# Patient Record
Sex: FEMALE | Race: WHITE | NOT HISPANIC OR LATINO | Employment: OTHER | URBAN - NONMETROPOLITAN AREA
[De-identification: names, ages, dates, MRNs, and addresses within clinical notes are randomized per-mention and may not be internally consistent; named-entity substitution may affect disease eponyms.]

---

## 2023-04-02 DIAGNOSIS — I73.9 PERIPHERAL VASCULAR DISEASE, UNSPECIFIED (CMS-HCC): ICD-10-CM

## 2023-04-06 RX ORDER — PENTOXIFYLLINE 400 MG/1
TABLET, EXTENDED RELEASE ORAL
Qty: 90 TABLET | Refills: 2 | Status: SHIPPED | OUTPATIENT
Start: 2023-04-06 | End: 2023-07-03 | Stop reason: SDUPTHER

## 2023-06-26 DIAGNOSIS — E78.5 HYPERLIPIDEMIA, UNSPECIFIED HYPERLIPIDEMIA TYPE: ICD-10-CM

## 2023-06-26 DIAGNOSIS — I73.9 PERIPHERAL VASCULAR DISEASE, UNSPECIFIED (CMS-HCC): ICD-10-CM

## 2023-06-26 DIAGNOSIS — F32.A DEPRESSION, UNSPECIFIED DEPRESSION TYPE: ICD-10-CM

## 2023-06-26 DIAGNOSIS — I10 BENIGN HYPERTENSION: ICD-10-CM

## 2023-06-26 DIAGNOSIS — K21.9 CHRONIC GERD: ICD-10-CM

## 2023-06-26 RX ORDER — OMEPRAZOLE 20 MG/1
20 CAPSULE, DELAYED RELEASE ORAL DAILY
COMMUNITY
End: 2023-06-26 | Stop reason: SDUPTHER

## 2023-06-26 RX ORDER — OMEPRAZOLE 20 MG/1
20 CAPSULE, DELAYED RELEASE ORAL DAILY
Qty: 90 CAPSULE | Refills: 1 | Status: SHIPPED | OUTPATIENT
Start: 2023-06-26 | End: 2023-07-10 | Stop reason: SDUPTHER

## 2023-07-03 LAB
ANION GAP IN SER/PLAS: 13 MMOL/L (ref 10–20)
CALCIUM (MG/DL) IN SER/PLAS: 9.6 MG/DL (ref 8.6–10.3)
CARBON DIOXIDE, TOTAL (MMOL/L) IN SER/PLAS: 28 MMOL/L (ref 21–32)
CHLORIDE (MMOL/L) IN SER/PLAS: 104 MMOL/L (ref 98–107)
CREATININE (MG/DL) IN SER/PLAS: 1.04 MG/DL (ref 0.5–1.05)
ESTIMATED AVERAGE GLUCOSE FOR HBA1C: 146 MG/DL
GFR FEMALE: 55 ML/MIN/1.73M2
GLUCOSE (MG/DL) IN SER/PLAS: 160 MG/DL (ref 74–99)
HEMOGLOBIN A1C/HEMOGLOBIN TOTAL IN BLOOD: 6.7 %
POTASSIUM (MMOL/L) IN SER/PLAS: 4.4 MMOL/L (ref 3.5–5.3)
SODIUM (MMOL/L) IN SER/PLAS: 141 MMOL/L (ref 136–145)
UREA NITROGEN (MG/DL) IN SER/PLAS: 14 MG/DL (ref 6–23)

## 2023-07-03 RX ORDER — NIFEDIPINE 30 MG/1
30 TABLET, FILM COATED, EXTENDED RELEASE ORAL DAILY
Qty: 30 TABLET | Refills: 0 | Status: SHIPPED | OUTPATIENT
Start: 2023-07-03 | End: 2023-07-10 | Stop reason: SDUPTHER

## 2023-07-03 RX ORDER — ROSUVASTATIN CALCIUM 40 MG/1
40 TABLET, COATED ORAL DAILY
Qty: 30 TABLET | Refills: 0 | Status: SHIPPED | OUTPATIENT
Start: 2023-07-03 | End: 2023-07-10 | Stop reason: SDUPTHER

## 2023-07-03 RX ORDER — LISINOPRIL 5 MG/1
5 TABLET ORAL DAILY
COMMUNITY
End: 2023-07-03 | Stop reason: SDUPTHER

## 2023-07-03 RX ORDER — LISINOPRIL 5 MG/1
5 TABLET ORAL DAILY
Qty: 30 TABLET | Refills: 0 | Status: SHIPPED | OUTPATIENT
Start: 2023-07-03 | End: 2023-07-10 | Stop reason: SDUPTHER

## 2023-07-03 RX ORDER — NIFEDIPINE 30 MG/1
30 TABLET, FILM COATED, EXTENDED RELEASE ORAL DAILY
COMMUNITY
End: 2023-07-03 | Stop reason: SDUPTHER

## 2023-07-03 RX ORDER — PAROXETINE HYDROCHLORIDE 20 MG/1
20 TABLET, FILM COATED ORAL DAILY
Qty: 30 TABLET | Refills: 0 | Status: SHIPPED | OUTPATIENT
Start: 2023-07-03 | End: 2023-07-10 | Stop reason: SDUPTHER

## 2023-07-03 RX ORDER — ROSUVASTATIN CALCIUM 40 MG/1
40 TABLET, COATED ORAL DAILY
COMMUNITY
End: 2023-07-03 | Stop reason: SDUPTHER

## 2023-07-03 RX ORDER — PAROXETINE HYDROCHLORIDE 20 MG/1
20 TABLET, FILM COATED ORAL DAILY
COMMUNITY
End: 2023-07-03 | Stop reason: SDUPTHER

## 2023-07-03 RX ORDER — PENTOXIFYLLINE 400 MG/1
400 TABLET, EXTENDED RELEASE ORAL 3 TIMES DAILY
Qty: 90 TABLET | Refills: 2 | Status: SHIPPED | OUTPATIENT
Start: 2023-07-03 | End: 2023-07-10 | Stop reason: SDUPTHER

## 2023-07-10 ENCOUNTER — OFFICE VISIT (OUTPATIENT)
Dept: PRIMARY CARE | Facility: CLINIC | Age: 78
End: 2023-07-10
Payer: MEDICARE

## 2023-07-10 VITALS
BODY MASS INDEX: 31.49 KG/M2 | HEART RATE: 84 BPM | HEIGHT: 65 IN | OXYGEN SATURATION: 95 % | WEIGHT: 189 LBS | SYSTOLIC BLOOD PRESSURE: 116 MMHG | DIASTOLIC BLOOD PRESSURE: 66 MMHG

## 2023-07-10 DIAGNOSIS — K21.9 CHRONIC GERD: ICD-10-CM

## 2023-07-10 DIAGNOSIS — I70.212 ATHEROSCLEROSIS OF NATIVE ARTERIES OF EXTREMITIES WITH INTERMITTENT CLAUDICATION, LEFT LEG (CMS-HCC): ICD-10-CM

## 2023-07-10 DIAGNOSIS — E78.5 HYPERLIPIDEMIA, UNSPECIFIED HYPERLIPIDEMIA TYPE: ICD-10-CM

## 2023-07-10 DIAGNOSIS — K62.5 RECTAL BLEEDING: ICD-10-CM

## 2023-07-10 DIAGNOSIS — D69.2 OTHER NONTHROMBOCYTOPENIC PURPURA (CMS-HCC): Primary | ICD-10-CM

## 2023-07-10 DIAGNOSIS — I73.9 PERIPHERAL VASCULAR DISEASE, UNSPECIFIED (CMS-HCC): ICD-10-CM

## 2023-07-10 DIAGNOSIS — F32.A DEPRESSION, UNSPECIFIED DEPRESSION TYPE: ICD-10-CM

## 2023-07-10 DIAGNOSIS — I10 BENIGN HYPERTENSION: ICD-10-CM

## 2023-07-10 DIAGNOSIS — F32.0 CURRENT MILD EPISODE OF MAJOR DEPRESSIVE DISORDER WITHOUT PRIOR EPISODE (CMS-HCC): ICD-10-CM

## 2023-07-10 DIAGNOSIS — I73.9 PAD (PERIPHERAL ARTERY DISEASE) (CMS-HCC): ICD-10-CM

## 2023-07-10 DIAGNOSIS — E11.9 TYPE 2 DIABETES MELLITUS WITHOUT COMPLICATION, WITHOUT LONG-TERM CURRENT USE OF INSULIN (MULTI): ICD-10-CM

## 2023-07-10 DIAGNOSIS — I15.9 SECONDARY HYPERTENSION: ICD-10-CM

## 2023-07-10 PROBLEM — E66.811 OBESITY (BMI 30.0-34.9): Status: ACTIVE | Noted: 2023-07-10

## 2023-07-10 PROBLEM — Z85.41 HISTORY OF CERVICAL CANCER: Status: ACTIVE | Noted: 2023-07-10

## 2023-07-10 PROBLEM — Q89.01 SPLEEN ABSENT: Status: ACTIVE | Noted: 2023-07-10

## 2023-07-10 PROBLEM — E66.9 OBESITY (BMI 30.0-34.9): Status: ACTIVE | Noted: 2023-07-10

## 2023-07-10 PROCEDURE — 99214 OFFICE O/P EST MOD 30 MIN: CPT | Performed by: INTERNAL MEDICINE

## 2023-07-10 PROCEDURE — 1159F MED LIST DOCD IN RCRD: CPT | Performed by: INTERNAL MEDICINE

## 2023-07-10 PROCEDURE — 3074F SYST BP LT 130 MM HG: CPT | Performed by: INTERNAL MEDICINE

## 2023-07-10 PROCEDURE — 1160F RVW MEDS BY RX/DR IN RCRD: CPT | Performed by: INTERNAL MEDICINE

## 2023-07-10 PROCEDURE — 1157F ADVNC CARE PLAN IN RCRD: CPT | Performed by: INTERNAL MEDICINE

## 2023-07-10 PROCEDURE — 3078F DIAST BP <80 MM HG: CPT | Performed by: INTERNAL MEDICINE

## 2023-07-10 RX ORDER — ROSUVASTATIN CALCIUM 40 MG/1
40 TABLET, COATED ORAL DAILY
Qty: 90 TABLET | Refills: 1 | Status: SHIPPED | OUTPATIENT
Start: 2023-07-10 | End: 2024-01-09 | Stop reason: SDUPTHER

## 2023-07-10 RX ORDER — ASPIRIN 81 MG/1
81 TABLET ORAL
COMMUNITY

## 2023-07-10 RX ORDER — CLOPIDOGREL BISULFATE 75 MG/1
75 TABLET ORAL
COMMUNITY
Start: 2021-10-17 | End: 2023-07-10 | Stop reason: SDUPTHER

## 2023-07-10 RX ORDER — OMEPRAZOLE 20 MG/1
20 CAPSULE, DELAYED RELEASE ORAL DAILY
Qty: 90 CAPSULE | Refills: 1 | Status: SHIPPED | OUTPATIENT
Start: 2023-07-10 | End: 2024-01-09 | Stop reason: WASHOUT

## 2023-07-10 RX ORDER — CLOPIDOGREL BISULFATE 75 MG/1
75 TABLET ORAL
Qty: 90 TABLET | Refills: 1 | Status: SHIPPED | OUTPATIENT
Start: 2023-07-10 | End: 2024-01-09 | Stop reason: SDUPTHER

## 2023-07-10 RX ORDER — PENTOXIFYLLINE 400 MG/1
400 TABLET, EXTENDED RELEASE ORAL 3 TIMES DAILY
Qty: 90 TABLET | Refills: 2 | Status: SHIPPED | OUTPATIENT
Start: 2023-07-10 | End: 2023-11-06

## 2023-07-10 RX ORDER — LISINOPRIL 5 MG/1
5 TABLET ORAL DAILY
Qty: 90 TABLET | Refills: 1 | Status: SHIPPED | OUTPATIENT
Start: 2023-07-10 | End: 2024-01-09 | Stop reason: SDUPTHER

## 2023-07-10 RX ORDER — NIFEDIPINE 30 MG/1
30 TABLET, FILM COATED, EXTENDED RELEASE ORAL DAILY
Qty: 90 TABLET | Refills: 1 | Status: SHIPPED | OUTPATIENT
Start: 2023-07-10 | End: 2024-01-09 | Stop reason: SDUPTHER

## 2023-07-10 RX ORDER — PAROXETINE HYDROCHLORIDE 20 MG/1
20 TABLET, FILM COATED ORAL DAILY
Qty: 90 TABLET | Refills: 1 | Status: SHIPPED | OUTPATIENT
Start: 2023-07-10 | End: 2024-01-09 | Stop reason: SDUPTHER

## 2023-07-10 ASSESSMENT — ENCOUNTER SYMPTOMS
DIARRHEA: 0
CHEST TIGHTNESS: 0
ABDOMINAL PAIN: 0
NAUSEA: 0
WHEEZING: 0
ARTHRALGIAS: 0
UNEXPECTED WEIGHT CHANGE: 0
COUGH: 1
PALPITATIONS: 0
BLOOD IN STOOL: 1
FATIGUE: 0
BACK PAIN: 0
SHORTNESS OF BREATH: 0
VOMITING: 0

## 2023-07-10 ASSESSMENT — PATIENT HEALTH QUESTIONNAIRE - PHQ9
2. FEELING DOWN, DEPRESSED OR HOPELESS: NOT AT ALL
SUM OF ALL RESPONSES TO PHQ9 QUESTIONS 1 AND 2: 0
1. LITTLE INTEREST OR PLEASURE IN DOING THINGS: NOT AT ALL

## 2023-07-10 NOTE — PROGRESS NOTES
Subjective   Patient ID: Phyllis A Snellenberger is a 78 y.o. female who presents for No chief complaint on file..  HPI  She is here today for her general checkup.  She is looking well and reports feeling well for the most part.  She states she does get frustrated sometimes because she is not able to do her gardening work like she used to.  We did conduct a full review of systems and she does explain that sometimes after going to the bathroom she does see a small amount of blood on toilet tissue.  She had a colonoscopy several months ago and is felt that she is having some post radiation effects from her previous cancer.  It has been thoroughly investigated and nothing of a serious nature is going on at this time.  We also reviewed her most recent laboratory test results and for the most part and very pleased with her numbers.  Her kidney function looks fine and her hemoglobin A1c is excellent at 6.7.  We are providing refills on several medications and if everything goes according to plan we will see her back in about 6 months for reevaluation  Review of Systems   Constitutional:  Negative for fatigue and unexpected weight change.   Respiratory:  Positive for cough. Negative for chest tightness, shortness of breath and wheezing.         Coughing recently because of high pollen counts which happens every year   Cardiovascular:  Negative for chest pain, palpitations and leg swelling.   Gastrointestinal:  Positive for blood in stool. Negative for abdominal pain, diarrhea, nausea and vomiting.   Musculoskeletal:  Negative for arthralgias and back pain.     Objective   Physical Exam  Vitals and nursing note reviewed.   Constitutional:       General: She is not in acute distress.     Appearance: Normal appearance.   HENT:      Head: Normocephalic and atraumatic.   Eyes:      Conjunctiva/sclera: Conjunctivae normal.   Cardiovascular:      Rate and Rhythm: Normal rate and regular rhythm.      Heart sounds: Normal heart  sounds.   Pulmonary:      Effort: No respiratory distress.      Breath sounds: No wheezing.   Abdominal:      Palpations: Abdomen is soft.      Tenderness: There is no abdominal tenderness. There is no guarding.   Musculoskeletal:         General: No swelling. Normal range of motion.   Skin:     General: Skin is warm and dry.   Neurological:      General: No focal deficit present.      Mental Status: She is alert and oriented to person, place, and time.   Psychiatric:         Behavior: Behavior normal.       Recent Results (from the past 672 hour(s))   Hemoglobin A1C    Collection Time: 07/03/23 10:02 AM   Result Value Ref Range    Hemoglobin A1C 6.7 (A) %    Estimated Average Glucose 146 MG/DL   Basic Metabolic Panel    Collection Time: 07/03/23 10:02 AM   Result Value Ref Range    Glucose 160 (H) 74 - 99 mg/dL    Sodium 141 136 - 145 mmol/L    Potassium 4.4 3.5 - 5.3 mmol/L    Chloride 104 98 - 107 mmol/L    Bicarbonate 28 21 - 32 mmol/L    Anion Gap 13 10 - 20 mmol/L    Urea Nitrogen 14 6 - 23 mg/dL    Creatinine 1.04 0.50 - 1.05 mg/dL    GFR Female 55 (A) >90 mL/min/1.73m2    Calcium 9.6 8.6 - 10.3 mg/dL       Assessment/Plan   Problem List Items Addressed This Visit       Other nonthrombocytopenic purpura (CMS/HCC) - Primary     -Stable  -Last platelet count was 2/3/1963         Relevant Medications    clopidogrel (Plavix) 75 mg tablet    aspirin 81 mg EC tablet    Type 2 diabetes mellitus without complication, without long-term current use of insulin (CMS/HCC)     -Her diabetes remains under good control as evidenced by hemoglobin A1c of 6.7  -She has been diet controlled up to this point  -She is reminded to go for yearly eye exams and to do daily foot checks and never go barefoot         Chronic GERD    Atherosclerosis of native arteries of extremities with intermittent claudication, left leg (CMS/HCC)    Hyperlipidemia     -She will have a fasting lipid profile done just prior to next follow-up appointment    -Continue with rosuvastatin 40 mg daily         Hypertension     -Currently well controlled  -We will continue on nifedipine CC 30 mg daily  -Lisinopril 5 mg daily         PAD (peripheral artery disease) (CMS/HCC)     -Her condition is stable at this time  -Previous stent placement  -Continue with vascular specialist  -Continue with clopidogrel 75 mg daily  -Continue with pentoxifylline 400 mg 3 times a day  -Continue to aggressively modify risk factors with blood pressure control and cholesterol control         Rectal bleeding     -Felt to be the sequela of her radiation therapy for her cervical cancer and had a recent colonoscopy  -We will continue to monitor         RESOLVED: Depression    Current mild episode of major depressive disorder without prior episode (CMS/HCC)     -She will continue with her paroxetine 20 mg daily          Other Visit Diagnoses       Benign hypertension        Peripheral vascular disease, unspecified (CMS/HCC)                   Olivia S RoyalDO

## 2023-07-10 NOTE — ASSESSMENT & PLAN NOTE
-She will have a fasting lipid profile done just prior to next follow-up appointment   -Continue with rosuvastatin 40 mg daily

## 2023-07-10 NOTE — ASSESSMENT & PLAN NOTE
-Currently well controlled  -We will continue on nifedipine CC 30 mg daily  -Lisinopril 5 mg daily

## 2023-07-10 NOTE — PATIENT INSTRUCTIONS
I have decided to have you keep all your medications the same and if everything goes according to plan we will see you back in 6 months for reevaluation.  Please remember to go to the lab fasting as we have ordered a check on your cholesterol as well as your blood sugar and kidney profile

## 2023-07-10 NOTE — ASSESSMENT & PLAN NOTE
-Her condition is stable at this time  -Previous stent placement  -Continue with vascular specialist  -Continue with clopidogrel 75 mg daily  -Continue with pentoxifylline 400 mg 3 times a day  -Continue to aggressively modify risk factors with blood pressure control and cholesterol control

## 2023-07-10 NOTE — ASSESSMENT & PLAN NOTE
-Felt to be the sequela of her radiation therapy for her cervical cancer and had a recent colonoscopy  -We will continue to monitor

## 2023-07-10 NOTE — ASSESSMENT & PLAN NOTE
-Her diabetes remains under good control as evidenced by hemoglobin A1c of 6.7  -She has been diet controlled up to this point  -She is reminded to go for yearly eye exams and to do daily foot checks and never go barefoot

## 2023-11-03 DIAGNOSIS — I73.9 PERIPHERAL VASCULAR DISEASE, UNSPECIFIED (CMS-HCC): ICD-10-CM

## 2023-11-06 RX ORDER — PENTOXIFYLLINE 400 MG/1
TABLET, EXTENDED RELEASE ORAL
Qty: 90 TABLET | Refills: 2 | Status: SHIPPED | OUTPATIENT
Start: 2023-11-06 | End: 2024-01-09 | Stop reason: SDUPTHER

## 2024-01-03 ENCOUNTER — LAB (OUTPATIENT)
Dept: LAB | Facility: LAB | Age: 79
End: 2024-01-03
Payer: MEDICARE

## 2024-01-03 DIAGNOSIS — E78.5 HYPERLIPIDEMIA, UNSPECIFIED HYPERLIPIDEMIA TYPE: ICD-10-CM

## 2024-01-03 DIAGNOSIS — E11.9 TYPE 2 DIABETES MELLITUS WITHOUT COMPLICATION, WITHOUT LONG-TERM CURRENT USE OF INSULIN (MULTI): ICD-10-CM

## 2024-01-03 LAB
ANION GAP SERPL CALC-SCNC: 12 MMOL/L (ref 10–20)
BUN SERPL-MCNC: 17 MG/DL (ref 6–23)
CALCIUM SERPL-MCNC: 9.3 MG/DL (ref 8.6–10.3)
CHLORIDE SERPL-SCNC: 102 MMOL/L (ref 98–107)
CHOLEST SERPL-MCNC: 186 MG/DL (ref 0–199)
CHOLESTEROL/HDL RATIO: 3.7
CO2 SERPL-SCNC: 29 MMOL/L (ref 21–32)
CREAT SERPL-MCNC: 0.89 MG/DL (ref 0.5–1.05)
EST. AVERAGE GLUCOSE BLD GHB EST-MCNC: 134 MG/DL
GFR SERPL CREATININE-BSD FRML MDRD: 66 ML/MIN/1.73M*2
GLUCOSE SERPL-MCNC: 123 MG/DL (ref 74–99)
HBA1C MFR BLD: 6.3 %
HDLC SERPL-MCNC: 50 MG/DL
LDLC SERPL CALC-MCNC: 104 MG/DL
NON HDL CHOLESTEROL: 136 MG/DL (ref 0–149)
POTASSIUM SERPL-SCNC: 4.4 MMOL/L (ref 3.5–5.3)
SODIUM SERPL-SCNC: 139 MMOL/L (ref 136–145)
TRIGL SERPL-MCNC: 162 MG/DL (ref 0–149)
VLDL: 32 MG/DL (ref 0–40)

## 2024-01-03 PROCEDURE — 80061 LIPID PANEL: CPT

## 2024-01-03 PROCEDURE — 36415 COLL VENOUS BLD VENIPUNCTURE: CPT

## 2024-01-03 PROCEDURE — 83036 HEMOGLOBIN GLYCOSYLATED A1C: CPT

## 2024-01-03 PROCEDURE — 80048 BASIC METABOLIC PNL TOTAL CA: CPT

## 2024-01-09 ENCOUNTER — OFFICE VISIT (OUTPATIENT)
Dept: PRIMARY CARE | Facility: CLINIC | Age: 79
End: 2024-01-09
Payer: MEDICARE

## 2024-01-09 VITALS
OXYGEN SATURATION: 96 % | DIASTOLIC BLOOD PRESSURE: 70 MMHG | SYSTOLIC BLOOD PRESSURE: 118 MMHG | HEART RATE: 69 BPM | BODY MASS INDEX: 31.17 KG/M2 | WEIGHT: 187.3 LBS

## 2024-01-09 DIAGNOSIS — E11.9 TYPE 2 DIABETES MELLITUS WITHOUT COMPLICATION, WITHOUT LONG-TERM CURRENT USE OF INSULIN (MULTI): ICD-10-CM

## 2024-01-09 DIAGNOSIS — E78.5 HYPERLIPIDEMIA, UNSPECIFIED HYPERLIPIDEMIA TYPE: ICD-10-CM

## 2024-01-09 DIAGNOSIS — K21.9 CHRONIC GERD: ICD-10-CM

## 2024-01-09 DIAGNOSIS — F32.A DEPRESSION, UNSPECIFIED DEPRESSION TYPE: ICD-10-CM

## 2024-01-09 DIAGNOSIS — F32.0 CURRENT MILD EPISODE OF MAJOR DEPRESSIVE DISORDER WITHOUT PRIOR EPISODE (CMS-HCC): ICD-10-CM

## 2024-01-09 DIAGNOSIS — E66.9 OBESITY (BMI 30.0-34.9): ICD-10-CM

## 2024-01-09 DIAGNOSIS — K62.5 RECTAL BLEEDING: ICD-10-CM

## 2024-01-09 DIAGNOSIS — I10 BENIGN HYPERTENSION: ICD-10-CM

## 2024-01-09 DIAGNOSIS — I73.9 PERIPHERAL VASCULAR DISEASE, UNSPECIFIED (CMS-HCC): ICD-10-CM

## 2024-01-09 DIAGNOSIS — C53.9 MALIGNANT NEOPLASM OF CERVIX, UNSPECIFIED SITE (MULTI): ICD-10-CM

## 2024-01-09 DIAGNOSIS — Z00.00 MEDICARE ANNUAL WELLNESS VISIT, SUBSEQUENT: Primary | ICD-10-CM

## 2024-01-09 DIAGNOSIS — D69.2 OTHER NONTHROMBOCYTOPENIC PURPURA (CMS-HCC): ICD-10-CM

## 2024-01-09 PROBLEM — R41.89 IMPAIRED COGNITION: Status: ACTIVE | Noted: 2024-01-09

## 2024-01-09 PROCEDURE — G0439 PPPS, SUBSEQ VISIT: HCPCS | Performed by: INTERNAL MEDICINE

## 2024-01-09 PROCEDURE — 3074F SYST BP LT 130 MM HG: CPT | Performed by: INTERNAL MEDICINE

## 2024-01-09 PROCEDURE — 3078F DIAST BP <80 MM HG: CPT | Performed by: INTERNAL MEDICINE

## 2024-01-09 PROCEDURE — 1036F TOBACCO NON-USER: CPT | Performed by: INTERNAL MEDICINE

## 2024-01-09 PROCEDURE — 99214 OFFICE O/P EST MOD 30 MIN: CPT | Performed by: INTERNAL MEDICINE

## 2024-01-09 PROCEDURE — 1170F FXNL STATUS ASSESSED: CPT | Performed by: INTERNAL MEDICINE

## 2024-01-09 PROCEDURE — 1159F MED LIST DOCD IN RCRD: CPT | Performed by: INTERNAL MEDICINE

## 2024-01-09 RX ORDER — PAROXETINE HYDROCHLORIDE 20 MG/1
20 TABLET, FILM COATED ORAL DAILY
Qty: 90 TABLET | Refills: 1 | Status: SHIPPED | OUTPATIENT
Start: 2024-01-09

## 2024-01-09 RX ORDER — NIFEDIPINE 30 MG/1
30 TABLET, FILM COATED, EXTENDED RELEASE ORAL DAILY
Qty: 90 TABLET | Refills: 1 | Status: SHIPPED | OUTPATIENT
Start: 2024-01-09

## 2024-01-09 RX ORDER — ROSUVASTATIN CALCIUM 40 MG/1
40 TABLET, COATED ORAL DAILY
Qty: 90 TABLET | Refills: 1 | Status: SHIPPED | OUTPATIENT
Start: 2024-01-09

## 2024-01-09 RX ORDER — PENTOXIFYLLINE 400 MG/1
400 TABLET, EXTENDED RELEASE ORAL
Qty: 90 TABLET | Refills: 3 | Status: SHIPPED | OUTPATIENT
Start: 2024-01-09 | End: 2024-05-28 | Stop reason: SDUPTHER

## 2024-01-09 RX ORDER — PANTOPRAZOLE SODIUM 20 MG/1
20 TABLET, DELAYED RELEASE ORAL
Qty: 90 TABLET | Refills: 1 | Status: SHIPPED | OUTPATIENT
Start: 2024-01-09 | End: 2024-04-23

## 2024-01-09 RX ORDER — LISINOPRIL 5 MG/1
5 TABLET ORAL DAILY
Qty: 90 TABLET | Refills: 1 | Status: SHIPPED | OUTPATIENT
Start: 2024-01-09

## 2024-01-09 RX ORDER — CLOPIDOGREL BISULFATE 75 MG/1
75 TABLET ORAL
Qty: 90 TABLET | Refills: 1 | Status: SHIPPED | OUTPATIENT
Start: 2024-01-09

## 2024-01-09 ASSESSMENT — PATIENT HEALTH QUESTIONNAIRE - PHQ9
SUM OF ALL RESPONSES TO PHQ9 QUESTIONS 1 AND 2: 0
2. FEELING DOWN, DEPRESSED OR HOPELESS: NOT AT ALL
1. LITTLE INTEREST OR PLEASURE IN DOING THINGS: NOT AT ALL

## 2024-01-09 ASSESSMENT — ACTIVITIES OF DAILY LIVING (ADL)
BATHING: INDEPENDENT
DRESSING: INDEPENDENT
DOING_HOUSEWORK: INDEPENDENT
TAKING_MEDICATION: INDEPENDENT
MANAGING_FINANCES: INDEPENDENT
GROCERY_SHOPPING: INDEPENDENT

## 2024-01-09 ASSESSMENT — ENCOUNTER SYMPTOMS
DIARRHEA: 0
COUGH: 0
PALPITATIONS: 0
NAUSEA: 0
ARTHRALGIAS: 0
VOMITING: 0
FATIGUE: 1
WHEEZING: 0
BACK PAIN: 0
ABDOMINAL PAIN: 0
SHORTNESS OF BREATH: 0
BLOOD IN STOOL: 1

## 2024-01-09 NOTE — ASSESSMENT & PLAN NOTE
-She has excellent control of her diabetes as evidenced by hemoglobin A1c of 6.3  -We are making no changes in her therapeutic regimen

## 2024-01-09 NOTE — PROGRESS NOTES
Impression: Age-related nuclear cataract, bilateral: H25.13. Plan: Discussed diagnosis in detail with patient. No treatment is required at this time. Will continue to observe condition and or symptoms. Patient instructed to call if condition gets worse. Subjective   Reason for Visit: Phyllis A Snellenberger is an 78 y.o. female here for a Medicare Wellness visit.     Reviewed all medications by prescribing practitioner or clinical pharmacist (such as prescriptions, OTCs, herbal therapies and supplements) and documented in the medical record.    HPI  She is here today for her routine checkup and we also completed her annual Medicare wellness visit.  She overall has been doing well although she does complain of some fatigue.  She also has noticed some blood in the stool and she is working along side Dr. Rosas to arrange to get that investigated further.  Her last colonoscopy we estimate was a few years ago.  She denies any recent symptoms of depression but she did have a fall the other day.  She states she had her dog with her at [employer] and when she went out in the parking lot to get in the car the dog entangled her legs with the leash causing her to lose her balance and fall.  Luckily she did not sustain any injuries and we have no concerns about her balance at this time.  We talked about fall prevention and I have specifically recommended she put grab bars in the bathroom.  Her memory appears to be stable at this time.  She also reports that her diet is relatively well-balanced.  She tries to stay active but we talked about trying to get 30 minutes of aerobic activity 5 days a week.  She also has advanced directives but we need a copy of her power of  for healthcare.  We also conducted a full review of systems and we went over the results of recent lab work.  Her cholesterol is close to perfect and her hemoglobin A1c was very good at 6.3.  We went over her health issues and we are providing refills on several medications.  We are switching her omeprazole to Protonix because she is on Plavix and she understands that there can be a drug interaction.  We will see her back in 6 months for reevaluation and sooner if any problems.  I also congratulated her  on her fantastic work with changing her diet and losing approximately 10 pounds over the past year!  Patient Care Team:  Olivia Leos DO as PCP - General  Olivia Leos DO as PCP - [facility] Attributed Provider     Review of Systems   Constitutional:  Positive for fatigue.   Respiratory:  Negative for cough, shortness of breath and wheezing.    Cardiovascular:  Negative for chest pain, palpitations and leg swelling.   Gastrointestinal:  Positive for blood in stool. Negative for abdominal pain, diarrhea, nausea and vomiting.   Musculoskeletal:  Negative for arthralgias and back pain.     Objective   Vitals:  /70   Pulse 69   Wt 85 kg (187 lb 4.8 oz)   SpO2 96%   BMI 31.17 kg/m²       Physical Exam  Vitals and nursing note reviewed.   Constitutional:       General: She is not in acute distress.     Appearance: Normal appearance.   HENT:      Head: Normocephalic and atraumatic.   Eyes:      Conjunctiva/sclera: Conjunctivae normal.   Cardiovascular:      Rate and Rhythm: Normal rate and regular rhythm.      Heart sounds: Normal heart sounds.   Pulmonary:      Effort: No respiratory distress.      Breath sounds: No wheezing.   Abdominal:      Palpations: Abdomen is soft.      Tenderness: There is no abdominal tenderness. There is no guarding.   Musculoskeletal:         General: No swelling. Normal range of motion.   Skin:     General: Skin is warm and dry.   Neurological:      General: No focal deficit present.      Mental Status: She is alert and oriented to person, place, and time.   Psychiatric:         Behavior: Behavior normal.       Recent Results (from the past 672 hour(s))   Basic Metabolic Panel    Collection Time: 01/03/24  9:15 AM   Result Value Ref Range    Glucose 123 (H) 74 - 99 mg/dL    Sodium 139 136 - 145 mmol/L    Potassium 4.4 3.5 - 5.3 mmol/L    Chloride 102 98 - 107 mmol/L    Bicarbonate 29 21 - 32 mmol/L    Anion Gap 12 10 - 20 mmol/L    Urea Nitrogen 17 6 - 23 mg/dL    Creatinine  0.89 0.50 - 1.05 mg/dL    eGFR 66 >60 mL/min/1.73m*2    Calcium 9.3 8.6 - 10.3 mg/dL   Lipid Panel    Collection Time: 01/03/24  9:15 AM   Result Value Ref Range    Cholesterol 186 0 - 199 mg/dL    HDL-Cholesterol 50.0 mg/dL    Cholesterol/HDL Ratio 3.7     LDL Calculated 104 (H) <=99 mg/dL    VLDL 32 0 - 40 mg/dL    Triglycerides 162 (H) 0 - 149 mg/dL    Non HDL Cholesterol 136 0 - 149 mg/dL   Hemoglobin A1C    Collection Time: 01/03/24  9:15 AM   Result Value Ref Range    Hemoglobin A1C 6.3 (H) see below %    Estimated Average Glucose 134 Not Established mg/dL       Assessment/Plan   Problem List Items Addressed This Visit       Other nonthrombocytopenic purpura (CMS/[facility])    Current Assessment & Plan     -Her condition has been stable         Relevant Medications    pentoxifylline (Trental) 400 mg ER tablet    clopidogrel (Plavix) 75 mg tablet    Type 2 diabetes mellitus without complication, without long-term current use of insulin (CMS/[facility])    Current Assessment & Plan     -She has excellent control of her diabetes as evidenced by hemoglobin A1c of 6.3  -We are making no changes in her therapeutic regimen         Relevant Orders    Basic Metabolic Panel    Hemoglobin A1C    Chronic GERD    Current Assessment & Plan     -Her condition has been well-controlled but we are switching from omeprazole to Protonix because she is on Plavix         Relevant Medications    pantoprazole (Protonix) 20 mg EC tablet    Hyperlipidemia    Current Assessment & Plan     -Her cholesterol is very close to perfect and we will continue with her current cholesterol-lowering medication         Relevant Medications    rosuvastatin (Crestor) 40 mg tablet    Benign hypertension    Current Assessment & Plan     -Her blood pressure is currently well-controlled and we will continue with her current antihypertensive regimen         Relevant Medications    lisinopril 5 mg tablet    Obesity (BMI 30.0-34.9)    Peripheral vascular disease,  unspecified (CMS/[facility])    Overview     Last Assessment & Plan: Formatting of this note might be different from the original. Azeb has known peripheral arterial occlusive disease. Her anticoagulation was stopped for a colonoscopy and she developed acute thrombosis of the left lower extremity.  She is now approximately 8 months post revascularization procedure of the left lower extremity. She states that she started having re-ocurring claudication of the left lower extremity approximately 4-6 weeks ago when she ambulates 1 block. Her non-invasive testing shows a significant decrease on the left. I had a discussion with her regarding obtaining a CTA and having her follow up with the surgeon to discuss options for revascularization. She is not interested at this time and will call if she decides to move forward. DG today shows right DG 0.85, TBI 0.51 and left DG 0.62 (previous 0.98), TBI 0.32 (previous 0.67).  Doppler waveforms are biphasic to the right and monophasic to the left. 10/7/2022  PTA L SFA, PTA L popliteal artery, PTA L TPT, Stenting distal left SFA/P1 popliteal artery, PTA of L PT and peroneal, Catheter directed administration of 6mg of tPA into proximal L SFA by Dr Griffin 5/14/2021 PTA of L TPT, LUDMILA, SFA and Stenting of residual L SFA stenosis. 7/24/2018 arterial duplex shows no evidence of bilateral popliteal artery aneurysm or ectasia noted.  Patent right popliteal artery appears normal triphasic waveforms.  The left mid to distal popliteal artery has heterogeneous plaque visualized with visual flow narrowing and biphasic waveforms.  This was performed [facility] 8/12/2003 MRA of the lower extremities with runoff shows total occlusion of the left SFA with three-vessel runoff to the left leg.  Also shows three-vessel runoff to the right leg with a normal appearance of the right common superficial femoral-popliteal artery this was performed in [place], [place] 8/4/2003 abdominal aortogram  with runoff showing left popliteal artery occlusion by Dr. Mickey Stevens MD at [facility] -Continue rosuvastatin, trental, and aspirin -We will repeat ABIs in 6 months -Continue to ambulate frequently -Maintain blood pressure per JNC 8 guidelines <140/90 -I would recommend transitioning to Lovenox if she needs to be off her Plavix for future surgery or procedures         Current Assessment & Plan     -She does follow with a specialist and she is currently on Plavix         Relevant Medications    pentoxifylline (Trental) 400 mg ER tablet    NIFEdipine ER (NIFEdipine CC) 30 mg 24 hr tablet    clopidogrel (Plavix) 75 mg tablet    Rectal bleeding    Current Assessment & Plan     -She is following with Dr. Rosas and will complete her assessment in the near future         RESOLVED: Depression    Relevant Medications    PARoxetine (Paxil) 20 mg tablet    Current mild episode of major depressive disorder without prior episode (CMS/HCC)    Current Assessment & Plan     -She is doing very well on her current medical regimen and we are providing refills.         Medicare annual wellness visit, subsequent - Primary    Current Assessment & Plan     -We discussed fall prevention  -I have recommended she put grab bars in the bathroom  -She will try to exercise 30 minutes 5 days a week  -She will try to get a copy of her power of  for healthcare for her chart here.  She states her son is her POA         Malignant neoplasm of cervix, unspecified site (CMS/HCC)    Current Assessment & Plan     -She has had surgery and she has done well

## 2024-01-09 NOTE — ASSESSMENT & PLAN NOTE
-Her condition has been well-controlled but we are switching from omeprazole to Protonix because she is on Plavix

## 2024-01-09 NOTE — ASSESSMENT & PLAN NOTE
-Her blood pressure is currently well-controlled and we will continue with her current antihypertensive regimen

## 2024-01-09 NOTE — ASSESSMENT & PLAN NOTE
-Her cholesterol is very close to perfect and we will continue with her current cholesterol-lowering medication

## 2024-01-09 NOTE — PATIENT INSTRUCTIONS
At this point I am pleased overall with how well you are doing  Please remember our discussion about fall prevention and try to put a grab bar in your bathroom  Try to make sure you are exercising regularly and they want us to get 30 minutes of aerobic activity 5 days a week  Please follow-up with Dr. Rosas regarding your rectal bleeding and make sure he understands that you are seeing blood recently  Please try to provide a copy of your power of  for healthcare so we can scan it in your chart  We switched your omeprazole to Protonix because you are on Plavix and it can be an interaction  I would like to see you back in approximately 6 months and just prior to that visit please remember to go get lab work.

## 2024-01-09 NOTE — ASSESSMENT & PLAN NOTE
-We discussed fall prevention  -I have recommended she put grab bars in the bathroom  -She will try to exercise 30 minutes 5 days a week  -She will try to get a copy of her power of  for healthcare for her chart here.  She states her son is her POA

## 2024-02-02 DIAGNOSIS — Z12.11 SCREEN FOR COLON CANCER: ICD-10-CM

## 2024-03-01 RX ORDER — ENOXAPARIN SODIUM 100 MG/ML
80 INJECTION SUBCUTANEOUS 2 TIMES DAILY
COMMUNITY
Start: 2024-02-06 | End: 2024-04-23 | Stop reason: ALTCHOICE

## 2024-03-06 ENCOUNTER — HOSPITAL ENCOUNTER (OUTPATIENT)
Dept: GASTROENTEROLOGY | Facility: CLINIC | Age: 79
Setting detail: OUTPATIENT SURGERY
Discharge: HOME | End: 2024-03-06
Payer: MEDICARE

## 2024-03-06 VITALS
BODY MASS INDEX: 30.71 KG/M2 | OXYGEN SATURATION: 96 % | TEMPERATURE: 98.2 F | SYSTOLIC BLOOD PRESSURE: 126 MMHG | WEIGHT: 184.3 LBS | RESPIRATION RATE: 16 BRPM | HEIGHT: 65 IN | DIASTOLIC BLOOD PRESSURE: 80 MMHG | HEART RATE: 70 BPM

## 2024-03-06 DIAGNOSIS — Z12.11 SCREEN FOR COLON CANCER: Primary | ICD-10-CM

## 2024-03-06 DIAGNOSIS — K62.7 CHRONIC RADIATION PROCTITIS: ICD-10-CM

## 2024-03-06 PROCEDURE — 45380 COLONOSCOPY AND BIOPSY: CPT | Mod: 52 | Performed by: INTERNAL MEDICINE

## 2024-03-06 PROCEDURE — 88305 TISSUE EXAM BY PATHOLOGIST: CPT | Mod: TC,SUR,SAMLAB | Performed by: INTERNAL MEDICINE

## 2024-03-06 PROCEDURE — 7100000010 HC PHASE TWO TIME - EACH INCREMENTAL 1 MINUTE

## 2024-03-06 PROCEDURE — 2500000004 HC RX 250 GENERAL PHARMACY W/ HCPCS (ALT 636 FOR OP/ED): Performed by: INTERNAL MEDICINE

## 2024-03-06 PROCEDURE — 88305 TISSUE EXAM BY PATHOLOGIST: CPT | Performed by: PATHOLOGY

## 2024-03-06 PROCEDURE — 3700000012 HC SEDATION LEVEL 5+ TIME - INITIAL 15 MINUTES 5/> YEARS

## 2024-03-06 PROCEDURE — G0500 MOD SEDAT ENDO SERVICE >5YRS: HCPCS | Performed by: INTERNAL MEDICINE

## 2024-03-06 PROCEDURE — 7100000009 HC PHASE TWO TIME - INITIAL BASE CHARGE

## 2024-03-06 RX ORDER — MEPERIDINE HYDROCHLORIDE 25 MG/ML
INJECTION INTRAMUSCULAR; INTRAVENOUS; SUBCUTANEOUS AS NEEDED
Status: COMPLETED | OUTPATIENT
Start: 2024-03-06 | End: 2024-03-06

## 2024-03-06 RX ORDER — MIDAZOLAM HYDROCHLORIDE 5 MG/ML
INJECTION, SOLUTION INTRAMUSCULAR; INTRAVENOUS AS NEEDED
Status: COMPLETED | OUTPATIENT
Start: 2024-03-06 | End: 2024-03-06

## 2024-03-06 RX ORDER — HYDROCORTISONE ACETATE 25 MG/1
25 SUPPOSITORY RECTAL DAILY
Qty: 14 SUPPOSITORY | Refills: 2 | Status: SHIPPED | OUTPATIENT
Start: 2024-03-06 | End: 2024-04-23 | Stop reason: WASHOUT

## 2024-03-06 RX ORDER — SODIUM CHLORIDE, SODIUM LACTATE, POTASSIUM CHLORIDE, CALCIUM CHLORIDE 600; 310; 30; 20 MG/100ML; MG/100ML; MG/100ML; MG/100ML
20 INJECTION, SOLUTION INTRAVENOUS CONTINUOUS
Status: DISCONTINUED | OUTPATIENT
Start: 2024-03-06 | End: 2024-03-07 | Stop reason: HOSPADM

## 2024-03-06 RX ADMIN — MIDAZOLAM HYDROCHLORIDE 1 MG: 5 INJECTION, SOLUTION INTRAMUSCULAR; INTRAVENOUS at 07:58

## 2024-03-06 RX ADMIN — MEPERIDINE HYDROCHLORIDE 25 MG: 25 INJECTION INTRAMUSCULAR; INTRAVENOUS; SUBCUTANEOUS at 07:46

## 2024-03-06 RX ADMIN — SODIUM CHLORIDE, POTASSIUM CHLORIDE, SODIUM LACTATE AND CALCIUM CHLORIDE 20 ML/HR: 600; 310; 30; 20 INJECTION, SOLUTION INTRAVENOUS at 07:40

## 2024-03-06 RX ADMIN — GLUCAGON HYDROCHLORIDE 1 MG: KIT at 07:47

## 2024-03-06 RX ADMIN — MIDAZOLAM HYDROCHLORIDE 2.5 MG: 5 INJECTION, SOLUTION INTRAMUSCULAR; INTRAVENOUS at 07:45

## 2024-03-06 RX ADMIN — MEPERIDINE HYDROCHLORIDE 25 MG: 25 INJECTION INTRAMUSCULAR; INTRAVENOUS; SUBCUTANEOUS at 07:56

## 2024-03-06 ASSESSMENT — PAIN SCALES - GENERAL
PAINLEVEL_OUTOF10: 0 - NO PAIN
PAINLEVEL_OUTOF10: 4
PAINLEVEL_OUTOF10: 0 - NO PAIN
PAINLEVEL_OUTOF10: 2

## 2024-03-06 ASSESSMENT — PAIN - FUNCTIONAL ASSESSMENT
PAIN_FUNCTIONAL_ASSESSMENT: 0-10

## 2024-03-06 ASSESSMENT — COLUMBIA-SUICIDE SEVERITY RATING SCALE - C-SSRS
2. HAVE YOU ACTUALLY HAD ANY THOUGHTS OF KILLING YOURSELF?: NO
6. HAVE YOU EVER DONE ANYTHING, STARTED TO DO ANYTHING, OR PREPARED TO DO ANYTHING TO END YOUR LIFE?: NO
1. IN THE PAST MONTH, HAVE YOU WISHED YOU WERE DEAD OR WISHED YOU COULD GO TO SLEEP AND NOT WAKE UP?: NO

## 2024-03-06 NOTE — DISCHARGE INSTRUCTIONS
Patient Instructions after a Colonoscopy      The anesthetics, sedatives or narcotics which were given to you today will be acting in your body for the next 24 hours, so you might feel a little sleepy or groggy.  This feeling should slowly wear off. Carefully read and follow the instructions.     You received sedation today:  - Do not drive or operate any machinery or power tools of any kind.   - No alcoholic beverages today, not even beer or wine.  - Do not make any important decisions or sign any legal documents.  - No over the counter medications that contain alcohol or that may cause drowsiness.  - Do not make any important decisions or sign any legal documents.    While it is common to experience mild to moderate abdominal distention, gas, or belching after your procedure, if any of these symptoms occur following discharge from the GI Lab or within one week of having your procedure, call the [facility] to be advised whether a visit to your nearest Urgent Care or Emergency Department is indicated.  Take this paper with you if you go.     - If you develop an allergic reaction to the medications that were given during your procedure such as difficulty breathing, rash, hives, severe nausea, vomiting or lightheadedness.  - If you experience chest pain, shortness of breath, severe abdominal pain, fevers and chills.  -If you develop signs and symptoms of bleeding such as blood in your spit, if your stools turn black, tarry, or bloody  - If you have not urinated within 8 hours following your procedure.  - If your IV site becomes painful, red, inflamed, or looks infected.    If you received a biopsy/polypectomy/sphincterotomy the following instructions apply below:    __ Do not use Aspirin containing products, non-steroidal medications or anti-coagulants for one week following your procedure. (Examples of these types of medications are: Advil, Arthrotec, Aleve, Coumadin, Ecotrin, Heparin, Ibuprofen,  Indocin, Motrin, Naprosyn, Nuprin, Plavix, Vioxx, and Voltarin, or their generic forms.  This list is not all-inclusive.  Check with your physician or pharmacist before resuming medications.)   __ Eat a soft diet today.  Avoid foods that are poorly digested for the next 24 hours.  These foods would include: nuts, beans, lettuce, red meats, and fried foods. Start with liquids and advance your diet as tolerated, gradually work up to eating solids.   __ Do not have a Barium Study or Enema for one week.    Your physician recommends the additional following instructions:    -You have a contact number available for emergencies. The signs and symptoms of potential delayed complications were discussed with you. You may return to normal activities tomorrow.  -Resume your previous diet.  -Continue your present medications.   -We are waiting for your pathology results.  -Your physician has recommended a repeat colonoscopy (date to be determined after pending pathology results are reviewed) for surveillance based on pathology results.  -The findings and recommendations have been discussed with you.  -The findings and recommendations were discussed with your family.  - Please see Medication Reconciliation Form for new medication/medications prescribed.       If you experience any problems or have any questions following discharge from the GI Lab, please call:        Nurse Signature                                                                        Date___________________                                                                            Patient/Responsible Party Signature                                        Date___________________

## 2024-03-06 NOTE — H&P
History Of Present Illness  Phyllis A Snellenberger is a 78 y.o. female presenting with multiple medical comorbidities she has had peripheral arterial disease last colonoscopy was taken off Plavix and developed clots throughout her right lower extremity requiring thrombolysis and stenting she was advised at that time to begin Lovenox if she ever had to stop her Plavix in the future.  She has been on Lovenox bridging took her last dose of Lovenox last evening.  Colonoscopy is being performed because of rectal bleeding.  Blood is mixed in with the stool at times and occasionally in toilet tissue when she wipes.  There is been no appreciable drop in her hemoglobin.  She denies any prior history of polyps and has not had any family history of colon cancer.  She is undergoing colonoscopy for diagnostic reasons    She did have uterine cancer and after total abdominal hysterectomy had intra operative pelvic radiation.  She is concerned she may have radiation injury to her organs.  g.     Past Medical History  Past Medical History:   Diagnosis Date    Anxiety     Asymptomatic menopausal state     History of menopause    Depression     Hyperlipidemia     Hypertension     Other conditions influencing health status     Positive test for human papillomavirus (HPV)    Peripheral artery disease (CMS/HCC)     Unilateral primary osteoarthritis, unspecified hip     Arthropathy of hip     Surgical History  Past Surgical History:   Procedure Laterality Date    OTHER SURGICAL HISTORY  12/14/2019    Breast reduction    OTHER SURGICAL HISTORY  12/14/2019    Splenectomy    OTHER SURGICAL HISTORY  12/14/2019    Tonsillectomy with adenoidectomy    OTHER SURGICAL HISTORY  12/14/2019    Hip replacement    OTHER SURGICAL HISTORY  07/14/2020    Colonoscopy    OTHER SURGICAL HISTORY  06/24/2022    Arterial stent placement    OTHER SURGICAL HISTORY  07/19/2022    Colonoscopy    OTHER SURGICAL HISTORY  06/24/2022    Dilation and curettage    OTHER  SURGICAL HISTORY  06/24/2022    Hysterectomy    OTHER SURGICAL HISTORY  06/15/2020    Cataract surgery     Social History  She reports that she has never smoked. She has never used smokeless tobacco. She reports current alcohol use. She reports that she does not currently use drugs.    Family History  Family History   Problem Relation Name Age of Onset    No Known Problems Mother      No Known Problems Father          Allergies  Allergies   Allergen Reactions    Adhesive Tape-Silicones Rash     Review of Systems  Pre-sedation Evaluation:  ASA Classification - ASA 2 - Patient with mild systemic disease with no functional limitations  Mallampati Score - II (hard and soft palate, upper portion of tonsils anduvula visible)    Physical Exam  Vitals and nursing note reviewed.   Constitutional:       Appearance: Normal appearance.   HENT:      Head: Normocephalic.      Mouth/Throat:      Mouth: Mucous membranes are moist.      Pharynx: Oropharynx is clear.   Eyes:      Conjunctiva/sclera: Conjunctivae normal.      Pupils: Pupils are equal, round, and reactive to light.   Cardiovascular:      Rate and Rhythm: Normal rate and regular rhythm.      Pulses: Normal pulses.      Heart sounds: Normal heart sounds.   Pulmonary:      Effort: Pulmonary effort is normal.      Breath sounds: Normal breath sounds.   Abdominal:      General: Abdomen is flat. Bowel sounds are normal.      Palpations: Abdomen is soft.   Musculoskeletal:      Cervical back: Normal range of motion and neck supple.   Skin:     General: Skin is warm and dry.   Neurological:      General: No focal deficit present.      Mental Status: She is alert and oriented to person, place, and time.   Psychiatric:         Behavior: Behavior normal.          Last Recorded Vitals  There were no vitals taken for this visit.     Assessment/Plan   Problem List Items Addressed This Visit    None  Visit Diagnoses       Screen for colon cancer    -  Primary    Relevant Orders     Colonoscopy Screening; High Risk Patient               PTA/Current Medications:  (Not in a hospital admission)    Current Outpatient Medications   Medication Sig Dispense Refill    enoxaparin (Lovenox) 80 mg/0.8 mL syringe Inject 0.8 mL (80 mg) under the skin 2 times a day.      aspirin 81 mg EC tablet Take 1 tablet (81 mg) by mouth once daily.      clopidogrel (Plavix) 75 mg tablet Take 1 tablet (75 mg) by mouth once daily. 90 tablet 1    lisinopril 5 mg tablet Take 1 tablet (5 mg) by mouth once daily. 90 tablet 1    NIFEdipine ER (NIFEdipine CC) 30 mg 24 hr tablet Take 1 tablet (30 mg) by mouth once daily. 90 tablet 1    pantoprazole (Protonix) 20 mg EC tablet Take 1 tablet (20 mg) by mouth once daily in the morning. Take before meals. Do not crush, chew, or split. 90 tablet 1    PARoxetine (Paxil) 20 mg tablet Take 1 tablet (20 mg) by mouth once daily. 90 tablet 1    pentoxifylline (Trental) 400 mg ER tablet Take 1 tablet (400 mg) by mouth 3 times a day with meals. Do not crush, chew, or split. 90 tablet 3    rosuvastatin (Crestor) 40 mg tablet Take 1 tablet (40 mg) by mouth once daily. 90 tablet 1     No current facility-administered medications for this encounter.     Perez Rosas, DO

## 2024-03-13 LAB
LABORATORY COMMENT REPORT: NORMAL
PATH REPORT.FINAL DX SPEC: NORMAL
PATH REPORT.GROSS SPEC: NORMAL
PATH REPORT.TOTAL CANCER: NORMAL

## 2024-03-22 ENCOUNTER — TELEPHONE (OUTPATIENT)
Dept: GASTROENTEROLOGY | Facility: CLINIC | Age: 79
End: 2024-03-22
Payer: MEDICARE

## 2024-03-22 NOTE — TELEPHONE ENCOUNTER
PATIENT NOTIFIED AND VERBALIZED UNDERSTANDING     Apt 5/16 ----- Message from Perez Rosas DO sent at 3/13/2024  4:40 PM EDT -----  Biopsies are consistent with prior radiation injury no evidence of malignancy follow-up in office

## 2024-04-20 DIAGNOSIS — K21.9 CHRONIC GERD: ICD-10-CM

## 2024-04-23 ENCOUNTER — OFFICE VISIT (OUTPATIENT)
Dept: GYNECOLOGIC ONCOLOGY | Facility: HOSPITAL | Age: 79
End: 2024-04-23
Payer: MEDICARE

## 2024-04-23 VITALS
BODY MASS INDEX: 31.1 KG/M2 | SYSTOLIC BLOOD PRESSURE: 148 MMHG | OXYGEN SATURATION: 93 % | WEIGHT: 186.9 LBS | TEMPERATURE: 97.7 F | HEART RATE: 66 BPM | RESPIRATION RATE: 18 BRPM | DIASTOLIC BLOOD PRESSURE: 80 MMHG

## 2024-04-23 DIAGNOSIS — Z85.41 HISTORY OF CERVICAL CANCER: Primary | ICD-10-CM

## 2024-04-23 PROCEDURE — 1157F ADVNC CARE PLAN IN RCRD: CPT | Performed by: OBSTETRICS & GYNECOLOGY

## 2024-04-23 PROCEDURE — 1159F MED LIST DOCD IN RCRD: CPT | Performed by: OBSTETRICS & GYNECOLOGY

## 2024-04-23 PROCEDURE — 3077F SYST BP >= 140 MM HG: CPT | Performed by: OBSTETRICS & GYNECOLOGY

## 2024-04-23 PROCEDURE — 99214 OFFICE O/P EST MOD 30 MIN: CPT | Performed by: OBSTETRICS & GYNECOLOGY

## 2024-04-23 PROCEDURE — 1126F AMNT PAIN NOTED NONE PRSNT: CPT | Performed by: OBSTETRICS & GYNECOLOGY

## 2024-04-23 PROCEDURE — 88175 CYTOPATH C/V AUTO FLUID REDO: CPT | Mod: TC,GCY | Performed by: OBSTETRICS & GYNECOLOGY

## 2024-04-23 PROCEDURE — 88141 CYTOPATH C/V INTERPRET: CPT | Performed by: PATHOLOGY

## 2024-04-23 PROCEDURE — 3079F DIAST BP 80-89 MM HG: CPT | Performed by: OBSTETRICS & GYNECOLOGY

## 2024-04-23 PROCEDURE — 1036F TOBACCO NON-USER: CPT | Performed by: OBSTETRICS & GYNECOLOGY

## 2024-04-23 PROCEDURE — 1160F RVW MEDS BY RX/DR IN RCRD: CPT | Performed by: OBSTETRICS & GYNECOLOGY

## 2024-04-23 PROCEDURE — 99214 OFFICE O/P EST MOD 30 MIN: CPT | Mod: 25 | Performed by: OBSTETRICS & GYNECOLOGY

## 2024-04-23 RX ORDER — TURMERIC 400 MG
1000 CAPSULE ORAL DAILY
COMMUNITY

## 2024-04-23 RX ORDER — PANTOPRAZOLE SODIUM 20 MG/1
20 TABLET, DELAYED RELEASE ORAL
Qty: 90 TABLET | Refills: 1 | Status: SHIPPED | OUTPATIENT
Start: 2024-04-23 | End: 2025-04-23

## 2024-04-23 RX ORDER — UBIDECARENONE 30 MG
30 CAPSULE ORAL DAILY
COMMUNITY

## 2024-04-23 RX ORDER — LANOLIN ALCOHOL/MO/W.PET/CERES
1000 CREAM (GRAM) TOPICAL DAILY
COMMUNITY

## 2024-04-23 ASSESSMENT — PAIN SCALES - GENERAL: PAINLEVEL: 0-NO PAIN

## 2024-04-23 NOTE — PROGRESS NOTES
Patient ID: Phyllis A Snellenberger is a 78 y.o. female.  Referring Physician: No referring provider defined for this encounter.  Primary Care Provider: Olivia Leos DO    Subjective    Patient Information: SNELLENBERGER, PHYLLIS A is  a 78 year old Female   Chief Complaint: colposcopy      Treatment History:    HPI: The patient is a IB1 but pathological stage IIA1     Referring Physician: Jero Membreno ([phone]) (Fax: [phone]) of [facility]  Other Physicians: Adal Gonzalez MD (Heart and Vascular - [facility] [address] [place] [address] Office [facility]. [id number]). [phone]     Tumor History:  -19 radical hysterectomy- Met high risk criteria so sent for chemoRT  -Finished RT May 2019  -PET 19: negative.   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   father -  of MI at 42yo, mother with alzheimers disease   Otherwise denies a history of gyn related cancers including ovarian, endometrial, breast, pancreas, and GI cancers.      Social History:   drinks 2 alcoholic beverages each afternoon.   Denies a history of smoking or recreational drug use. The patient is retired. Lives with son who smokes.     OBGYN History:  The patient is a .  Entered menopause in her 40s.  She has used OCP for 6 months years ago.  She has used  HRT for 10 years in the past.       Screening:  -Mammogram: 2020 normal  Interval History:    She denies abnormal vaginal bleeding or discharge, continues dilating 2x weekly and she dislikes doing it and would like to stop, ongoing rectal bleeding, colonoscopy done 3 weeks ago. Requested to see a gyn near her home if she is not required to come her as she was told to continue coming her for 5 years.      Objective    BSA: There is no height or weight on file to calculate BSA.  There were no vitals taken for this visit.     Physical Exam  Constitutional:       General: She is not in acute distress.     Appearance: Normal appearance.   Cardiovascular:      Rate and Rhythm: Normal rate and regular rhythm.   Pulmonary:      Effort: Pulmonary effort is normal.      Breath sounds: Normal breath sounds.   Abdominal:      General: Bowel sounds are normal. There is no distension.   Genitourinary:     Comments: Pelvic exam: No obvious masses on bimanual or rectovaginal exam. PAP collected.  Radiation changes involving vaginal mucosa noted      Musculoskeletal:      Right forearm: Normal.      Left forearm: Normal.      Right hand: Normal.      Left hand: Normal.      Right lower leg: Normal.      Left lower leg: Normal.      Right foot: Normal.      Left foot: Normal.         Performance Status:  Asymptomatic    Assessment/Plan     Oncology History    No history exists.        Problem List Items Addressed This Visit             ICD-10-CM    History of cervical cancer - Primary Z85.41    Relevant Orders    THINPREP PAP       Treatment Plans       No treatment plans exist        - Performed pelvic examination and PAP in office today without complications. No evidence of recurrent disease in exam.   - Will call her with appointment after PAP results return - may prefer follow up at [place] or [place] location for convenience.  Ok for NP follow up.            Scribe Attestation  By signing my name below, I, Lovely Starr,  Scribe   attest that this documentation has been prepared under the direction and in the presence of Sushila Gould MD.

## 2024-05-01 LAB
CYTOLOGY CMNT CVX/VAG CYTO-IMP: NORMAL
LAB AP HISTORY OF MALIGNANCY: NORMAL
LAB AP HPV HR: NORMAL
LAB AP TREATMENT HISTORY: NORMAL
LABORATORY COMMENT REPORT: NORMAL
PATH REPORT.RELEVANT HX SPEC: NORMAL
PATH REPORT.TOTAL CANCER: NORMAL

## 2024-05-13 ENCOUNTER — TELEPHONE (OUTPATIENT)
Dept: GYNECOLOGIC ONCOLOGY | Facility: HOSPITAL | Age: 79
End: 2024-05-13
Payer: MEDICARE

## 2024-05-13 NOTE — TELEPHONE ENCOUNTER
Phoned patient to notify that pap results are stable and negative for dysplasia.  Notified patient that Dr. Gould recommends repeat pap in 1 year.  Message left on patient voice mail

## 2024-05-16 ENCOUNTER — APPOINTMENT (OUTPATIENT)
Dept: GASTROENTEROLOGY | Facility: CLINIC | Age: 79
End: 2024-05-16
Payer: MEDICARE

## 2024-05-24 ENCOUNTER — OFFICE VISIT (OUTPATIENT)
Dept: GASTROENTEROLOGY | Facility: CLINIC | Age: 79
End: 2024-05-24
Payer: MEDICARE

## 2024-05-24 DIAGNOSIS — K52.0 RADIATION ENTERITIS: Primary | ICD-10-CM

## 2024-05-24 PROCEDURE — 1157F ADVNC CARE PLAN IN RCRD: CPT | Performed by: INTERNAL MEDICINE

## 2024-05-24 PROCEDURE — 1160F RVW MEDS BY RX/DR IN RCRD: CPT | Performed by: INTERNAL MEDICINE

## 2024-05-24 PROCEDURE — 99213 OFFICE O/P EST LOW 20 MIN: CPT | Performed by: INTERNAL MEDICINE

## 2024-05-24 PROCEDURE — 1159F MED LIST DOCD IN RCRD: CPT | Performed by: INTERNAL MEDICINE

## 2024-05-24 ASSESSMENT — ENCOUNTER SYMPTOMS
ENDOCRINE NEGATIVE: 1
EYES NEGATIVE: 1
CONSTITUTIONAL NEGATIVE: 1
CARDIOVASCULAR NEGATIVE: 1
RESPIRATORY NEGATIVE: 1
NEUROLOGICAL NEGATIVE: 1
HEMATOLOGIC/LYMPHATIC NEGATIVE: 1

## 2024-05-24 NOTE — PROGRESS NOTES
Subjective   Patient ID: Phyllis A Snellenberger is a 79 y.o. female who presents for Colonoscopy (Colonoscopy follow up. Still having some bleeding 1x a month. ).  HPI patient seen today in follow-up from colonoscopy.  Findings consistent with radiation injury to the distal sigmoid colon and rectum.  Biopsies confirm same.  She is having intermittent bleeding none active at this time.  Overall she is doing well.  Does not wish to pursue any further treatment.    Review of Systems   Constitutional: Negative.    HENT: Negative.     Eyes: Negative.    Respiratory: Negative.     Cardiovascular: Negative.    Endocrine: Negative.    Genitourinary: Negative.    Neurological: Negative.    Hematological: Negative.        Objective   Physical Exam  Vitals and nursing note reviewed.   Constitutional:       Appearance: Normal appearance.   HENT:      Head: Normocephalic.      Mouth/Throat:      Mouth: Mucous membranes are moist.      Pharynx: Oropharynx is clear.   Eyes:      Conjunctiva/sclera: Conjunctivae normal.      Pupils: Pupils are equal, round, and reactive to light.   Cardiovascular:      Pulses: Normal pulses.      Heart sounds: Normal heart sounds.   Pulmonary:      Effort: Pulmonary effort is normal.      Breath sounds: Normal breath sounds.   Abdominal:      General: Abdomen is flat. Bowel sounds are normal.      Palpations: Abdomen is soft.   Musculoskeletal:      Cervical back: Normal range of motion and neck supple.   Skin:     General: Skin is warm and dry.   Neurological:      General: No focal deficit present.      Mental Status: She is alert and oriented to person, place, and time.   Psychiatric:         Behavior: Behavior normal.         Assessment/Plan   Diagnoses and all orders for this visit:  Radiation enteritis    I advised her to keep a close eye on her bowel movements avoid the high residue foods.  If she starts to have significant bleeding if her step would be hydrocortisone suppositories if that  fails we can always go in and do additional laser coagulation of the bleeding lesions.  At this point she is happy with how she is doing will keep us aware of any future needs.  No scheduled follow-up was made and she can follow-up with her family doctor until any pressing issues turned out       Perez Rosas,  05/24/24 1:22 PM

## 2024-05-28 ENCOUNTER — TELEPHONE (OUTPATIENT)
Dept: PRIMARY CARE | Facility: CLINIC | Age: 79
End: 2024-05-28
Payer: MEDICARE

## 2024-05-28 DIAGNOSIS — I73.9 PERIPHERAL VASCULAR DISEASE, UNSPECIFIED (CMS-HCC): ICD-10-CM

## 2024-05-28 RX ORDER — PENTOXIFYLLINE 400 MG/1
400 TABLET, EXTENDED RELEASE ORAL
Qty: 90 TABLET | Refills: 5 | Status: SHIPPED | OUTPATIENT
Start: 2024-05-28

## 2024-06-11 DIAGNOSIS — E78.5 HYPERLIPIDEMIA, UNSPECIFIED HYPERLIPIDEMIA TYPE: ICD-10-CM

## 2024-06-11 DIAGNOSIS — I10 BENIGN HYPERTENSION: ICD-10-CM

## 2024-06-11 DIAGNOSIS — F32.A DEPRESSION, UNSPECIFIED DEPRESSION TYPE: ICD-10-CM

## 2024-06-11 DIAGNOSIS — I73.9 PERIPHERAL VASCULAR DISEASE, UNSPECIFIED (CMS-HCC): ICD-10-CM

## 2024-06-12 RX ORDER — PAROXETINE HYDROCHLORIDE 20 MG/1
20 TABLET, FILM COATED ORAL DAILY
Qty: 90 TABLET | Refills: 1 | Status: SHIPPED | OUTPATIENT
Start: 2024-06-12

## 2024-06-12 RX ORDER — LISINOPRIL 5 MG/1
5 TABLET ORAL DAILY
Qty: 90 TABLET | Refills: 1 | Status: SHIPPED | OUTPATIENT
Start: 2024-06-12

## 2024-06-12 RX ORDER — NIFEDIPINE 30 MG/1
30 TABLET, FILM COATED, EXTENDED RELEASE ORAL DAILY
Qty: 90 TABLET | Refills: 1 | Status: SHIPPED | OUTPATIENT
Start: 2024-06-12

## 2024-06-12 RX ORDER — CLOPIDOGREL BISULFATE 75 MG/1
75 TABLET ORAL
Qty: 90 TABLET | Refills: 1 | Status: SHIPPED | OUTPATIENT
Start: 2024-06-12

## 2024-06-12 RX ORDER — ROSUVASTATIN CALCIUM 40 MG/1
40 TABLET, COATED ORAL DAILY
Qty: 90 TABLET | Refills: 1 | Status: SHIPPED | OUTPATIENT
Start: 2024-06-12

## 2024-07-01 ENCOUNTER — LAB (OUTPATIENT)
Dept: LAB | Facility: LAB | Age: 79
End: 2024-07-01
Payer: MEDICARE

## 2024-07-01 DIAGNOSIS — E11.9 TYPE 2 DIABETES MELLITUS WITHOUT COMPLICATION, WITHOUT LONG-TERM CURRENT USE OF INSULIN (MULTI): ICD-10-CM

## 2024-07-01 LAB
ANION GAP SERPL CALC-SCNC: 11 MMOL/L (ref 10–20)
BUN SERPL-MCNC: 14 MG/DL (ref 6–23)
CALCIUM SERPL-MCNC: 9.1 MG/DL (ref 8.6–10.3)
CHLORIDE SERPL-SCNC: 103 MMOL/L (ref 98–107)
CO2 SERPL-SCNC: 31 MMOL/L (ref 21–32)
CREAT SERPL-MCNC: 0.8 MG/DL (ref 0.5–1.05)
EGFRCR SERPLBLD CKD-EPI 2021: 75 ML/MIN/1.73M*2
EST. AVERAGE GLUCOSE BLD GHB EST-MCNC: 137 MG/DL
GLUCOSE SERPL-MCNC: 105 MG/DL (ref 74–99)
HBA1C MFR BLD: 6.4 %
POTASSIUM SERPL-SCNC: 4.4 MMOL/L (ref 3.5–5.3)
SODIUM SERPL-SCNC: 141 MMOL/L (ref 136–145)

## 2024-07-01 PROCEDURE — 36415 COLL VENOUS BLD VENIPUNCTURE: CPT

## 2024-07-01 PROCEDURE — 80048 BASIC METABOLIC PNL TOTAL CA: CPT

## 2024-07-01 PROCEDURE — 83036 HEMOGLOBIN GLYCOSYLATED A1C: CPT

## 2024-07-09 ENCOUNTER — LAB (OUTPATIENT)
Dept: LAB | Facility: LAB | Age: 79
End: 2024-07-09
Payer: MEDICARE

## 2024-07-09 ENCOUNTER — APPOINTMENT (OUTPATIENT)
Dept: PRIMARY CARE | Facility: CLINIC | Age: 79
End: 2024-07-09
Payer: MEDICARE

## 2024-07-09 VITALS
BODY MASS INDEX: 30.82 KG/M2 | WEIGHT: 185 LBS | HEIGHT: 65 IN | HEART RATE: 74 BPM | OXYGEN SATURATION: 94 % | DIASTOLIC BLOOD PRESSURE: 78 MMHG | SYSTOLIC BLOOD PRESSURE: 130 MMHG

## 2024-07-09 DIAGNOSIS — I10 BENIGN HYPERTENSION: ICD-10-CM

## 2024-07-09 DIAGNOSIS — K21.9 CHRONIC GERD: ICD-10-CM

## 2024-07-09 DIAGNOSIS — R53.83 OTHER FATIGUE: ICD-10-CM

## 2024-07-09 DIAGNOSIS — E78.2 MIXED HYPERLIPIDEMIA: ICD-10-CM

## 2024-07-09 DIAGNOSIS — E78.5 HYPERLIPIDEMIA, UNSPECIFIED HYPERLIPIDEMIA TYPE: ICD-10-CM

## 2024-07-09 DIAGNOSIS — K52.0 RADIATION ENTERITIS: ICD-10-CM

## 2024-07-09 DIAGNOSIS — E11.9 TYPE 2 DIABETES MELLITUS WITHOUT COMPLICATION, WITHOUT LONG-TERM CURRENT USE OF INSULIN (MULTI): ICD-10-CM

## 2024-07-09 DIAGNOSIS — I73.9 PERIPHERAL VASCULAR DISEASE, UNSPECIFIED (CMS-HCC): ICD-10-CM

## 2024-07-09 DIAGNOSIS — F32.0 CURRENT MILD EPISODE OF MAJOR DEPRESSIVE DISORDER WITHOUT PRIOR EPISODE (CMS-HCC): ICD-10-CM

## 2024-07-09 DIAGNOSIS — R53.83 OTHER FATIGUE: Primary | ICD-10-CM

## 2024-07-09 DIAGNOSIS — F32.A DEPRESSION, UNSPECIFIED DEPRESSION TYPE: ICD-10-CM

## 2024-07-09 PROBLEM — Z00.00 MEDICARE ANNUAL WELLNESS VISIT, SUBSEQUENT: Status: RESOLVED | Noted: 2024-01-09 | Resolved: 2024-07-09

## 2024-07-09 PROBLEM — Z85.41 HISTORY OF CERVICAL CANCER: Status: RESOLVED | Noted: 2023-07-10 | Resolved: 2024-07-09

## 2024-07-09 PROBLEM — R41.89 IMPAIRED COGNITION: Status: RESOLVED | Noted: 2024-01-09 | Resolved: 2024-07-09

## 2024-07-09 LAB
BASOPHILS # BLD AUTO: 0.03 X10*3/UL (ref 0–0.1)
BASOPHILS NFR BLD AUTO: 0.6 %
EOSINOPHIL # BLD AUTO: 0.09 X10*3/UL (ref 0–0.4)
EOSINOPHIL NFR BLD AUTO: 1.9 %
ERYTHROCYTE [DISTWIDTH] IN BLOOD BY AUTOMATED COUNT: 15.3 % (ref 11.5–14.5)
HCT VFR BLD AUTO: 44.6 % (ref 36–46)
HGB BLD-MCNC: 14.4 G/DL (ref 12–16)
IMM GRANULOCYTES # BLD AUTO: 0.02 X10*3/UL (ref 0–0.5)
IMM GRANULOCYTES NFR BLD AUTO: 0.4 % (ref 0–0.9)
LYMPHOCYTES # BLD AUTO: 1.27 X10*3/UL (ref 0.8–3)
LYMPHOCYTES NFR BLD AUTO: 26.2 %
MCH RBC QN AUTO: 34.6 PG (ref 26–34)
MCHC RBC AUTO-ENTMCNC: 32.3 G/DL (ref 32–36)
MCV RBC AUTO: 107 FL (ref 80–100)
MONOCYTES # BLD AUTO: 0.57 X10*3/UL (ref 0.05–0.8)
MONOCYTES NFR BLD AUTO: 11.8 %
NEUTROPHILS # BLD AUTO: 2.87 X10*3/UL (ref 1.6–5.5)
NEUTROPHILS NFR BLD AUTO: 59.1 %
NRBC BLD-RTO: 0 /100 WBCS (ref 0–0)
PLATELET # BLD AUTO: 366 X10*3/UL (ref 150–450)
RBC # BLD AUTO: 4.16 X10*6/UL (ref 4–5.2)
WBC # BLD AUTO: 4.9 X10*3/UL (ref 4.4–11.3)

## 2024-07-09 PROCEDURE — 85025 COMPLETE CBC W/AUTO DIFF WBC: CPT

## 2024-07-09 PROCEDURE — 99214 OFFICE O/P EST MOD 30 MIN: CPT | Performed by: INTERNAL MEDICINE

## 2024-07-09 PROCEDURE — 1159F MED LIST DOCD IN RCRD: CPT | Performed by: INTERNAL MEDICINE

## 2024-07-09 PROCEDURE — 1036F TOBACCO NON-USER: CPT | Performed by: INTERNAL MEDICINE

## 2024-07-09 PROCEDURE — 1157F ADVNC CARE PLAN IN RCRD: CPT | Performed by: INTERNAL MEDICINE

## 2024-07-09 PROCEDURE — 1160F RVW MEDS BY RX/DR IN RCRD: CPT | Performed by: INTERNAL MEDICINE

## 2024-07-09 PROCEDURE — 3075F SYST BP GE 130 - 139MM HG: CPT | Performed by: INTERNAL MEDICINE

## 2024-07-09 PROCEDURE — 3078F DIAST BP <80 MM HG: CPT | Performed by: INTERNAL MEDICINE

## 2024-07-09 PROCEDURE — 1124F ACP DISCUSS-NO DSCNMKR DOCD: CPT | Performed by: INTERNAL MEDICINE

## 2024-07-09 PROCEDURE — 36415 COLL VENOUS BLD VENIPUNCTURE: CPT

## 2024-07-09 RX ORDER — ROSUVASTATIN CALCIUM 40 MG/1
40 TABLET, COATED ORAL DAILY
Qty: 90 TABLET | Refills: 1 | Status: SHIPPED | OUTPATIENT
Start: 2024-07-09

## 2024-07-09 RX ORDER — PAROXETINE HYDROCHLORIDE 20 MG/1
20 TABLET, FILM COATED ORAL DAILY
Qty: 90 TABLET | Refills: 1 | Status: SHIPPED | OUTPATIENT
Start: 2024-07-09

## 2024-07-09 RX ORDER — LISINOPRIL 5 MG/1
5 TABLET ORAL DAILY
Qty: 90 TABLET | Refills: 1 | Status: SHIPPED | OUTPATIENT
Start: 2024-07-09

## 2024-07-09 RX ORDER — NIFEDIPINE 30 MG/1
30 TABLET, FILM COATED, EXTENDED RELEASE ORAL DAILY
Qty: 90 TABLET | Refills: 1 | Status: SHIPPED | OUTPATIENT
Start: 2024-07-09

## 2024-07-09 RX ORDER — PANTOPRAZOLE SODIUM 20 MG/1
20 TABLET, DELAYED RELEASE ORAL
Qty: 90 TABLET | Refills: 1 | Status: SHIPPED | OUTPATIENT
Start: 2024-07-09 | End: 2025-07-09

## 2024-07-09 RX ORDER — PENTOXIFYLLINE 400 MG/1
400 TABLET, EXTENDED RELEASE ORAL
Qty: 270 TABLET | Refills: 1 | Status: SHIPPED | OUTPATIENT
Start: 2024-07-09

## 2024-07-09 RX ORDER — CLOPIDOGREL BISULFATE 75 MG/1
75 TABLET ORAL
Qty: 90 TABLET | Refills: 1 | Status: SHIPPED | OUTPATIENT
Start: 2024-07-09

## 2024-07-09 ASSESSMENT — ENCOUNTER SYMPTOMS
SHORTNESS OF BREATH: 0
VOMITING: 0
WHEEZING: 0
NAUSEA: 0
COUGH: 0
DIARRHEA: 0
ABDOMINAL PAIN: 0
PALPITATIONS: 0
CHEST TIGHTNESS: 0
BLOOD IN STOOL: 1
FATIGUE: 1
BACK PAIN: 0

## 2024-07-09 NOTE — ASSESSMENT & PLAN NOTE
-Because of intermittent rectal bleeding she will get a CBC drawn today before leaving and have agreed to contact her with results

## 2024-07-09 NOTE — ASSESSMENT & PLAN NOTE
-Her condition remains stable and she will continue to follow with her specialists  -We will continue with her current medical regimen

## 2024-07-09 NOTE — PROGRESS NOTES
Subjective   Patient ID: Phyllis A Snellenberger is a 79 y.o. female who presents for Follow-up (6 MO FUV).  HPI  She is here today for her general checkup.  Her blood pressure remains under good control.  We did conduct a review of systems and she states she feels like her energy levels are lower than usual.  We also discovered that she still does occasionally see some blood in the stool on the order of twice a month.  She states she had a colonoscopy with Dr. Rosas a couple of months ago and he found evidence of irritation from her prior radiation therapy.  We discussed checking a CBC because of her fatigue as it has been a while.  I have agreed to contact her with results.  She also has had problems with bilateral knee pain.  We reviewed her laboratory test results and overall I am pleased with her numbers.  Her hemoglobin A1c is 6.4 and she is diet controlled.  She does try to eat a healthy diet and stay physically active.  We we will plan on seeing her back in 6 months for another evaluation and just prior to that visit she will get lab work.  Review of Systems   Constitutional:  Positive for fatigue.   Respiratory:  Negative for cough, chest tightness, shortness of breath and wheezing.    Cardiovascular:  Negative for chest pain, palpitations and leg swelling.   Gastrointestinal:  Positive for blood in stool. Negative for abdominal pain, diarrhea, nausea and vomiting.   Musculoskeletal:  Negative for back pain.     Objective   Physical Exam  Vitals and nursing note reviewed.   Constitutional:       General: She is not in acute distress.     Appearance: Normal appearance.   HENT:      Head: Normocephalic and atraumatic.   Eyes:      Conjunctiva/sclera: Conjunctivae normal.   Cardiovascular:      Rate and Rhythm: Normal rate and regular rhythm.      Heart sounds: Normal heart sounds.   Pulmonary:      Effort: No respiratory distress.      Breath sounds: No wheezing.   Abdominal:      Palpations: Abdomen is  soft.      Tenderness: There is no abdominal tenderness. There is no guarding.   Musculoskeletal:         General: No swelling. Normal range of motion.   Skin:     General: Skin is warm and dry.   Neurological:      General: No focal deficit present.      Mental Status: She is alert and oriented to person, place, and time.   Psychiatric:         Behavior: Behavior normal.       Recent Results (from the past 672 hour(s))   Basic Metabolic Panel    Collection Time: 07/01/24 11:01 AM   Result Value Ref Range    Glucose 105 (H) 74 - 99 mg/dL    Sodium 141 136 - 145 mmol/L    Potassium 4.4 3.5 - 5.3 mmol/L    Chloride 103 98 - 107 mmol/L    Bicarbonate 31 21 - 32 mmol/L    Anion Gap 11 10 - 20 mmol/L    Urea Nitrogen 14 6 - 23 mg/dL    Creatinine 0.80 0.50 - 1.05 mg/dL    eGFR 75 >60 mL/min/1.73m*2    Calcium 9.1 8.6 - 10.3 mg/dL   Hemoglobin A1C    Collection Time: 07/01/24 11:01 AM   Result Value Ref Range    Hemoglobin A1C 6.4 (H) see below %    Estimated Average Glucose 137 Not Established mg/dL       Assessment/Plan   Problem List Items Addressed This Visit             ICD-10-CM    Type 2 diabetes mellitus without complication, without long-term current use of insulin (Multi) E11.9     -She has done a great job in controlling her sugars.  She has been diet controlled  -Her hemoglobin A1c was 6.4 and we will continue to monitor         Relevant Orders    Hemoglobin A1C    Basic Metabolic Panel    Chronic GERD K21.9     -Currently adequately controlled on her Protonix and she will continue with her current medical regimen         Relevant Medications    pantoprazole (ProtoNix) 20 mg EC tablet    Hyperlipidemia E78.5     -We will check a lipid profile just prior to her next follow-up visit         Relevant Medications    rosuvastatin (Crestor) 40 mg tablet    Other Relevant Orders    Lipid Panel    Benign hypertension I10     -Blood pressure is currently well-controlled so we will continue with her current  antihypertensive regimen         Relevant Medications    lisinopril 5 mg tablet    Peripheral vascular disease, unspecified (CMS-HCC) I73.9     -Her condition remains stable and she will continue to follow with her specialists  -We will continue with her current medical regimen         Relevant Medications    clopidogrel (Plavix) 75 mg tablet    NIFEdipine ER (NIFEdipine CC) 30 mg 24 hr tablet    pentoxifylline (Trental) 400 mg ER tablet    Current mild episode of major depressive disorder without prior episode (CMS-HCC) F32.0     -Doing well at this time we will continue with her current medical regimen         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 Rosas for rectal bleeding         Other fatigue - Primary R53.83     -Because of intermittent rectal bleeding she will get a CBC drawn today before leaving and have agreed to contact her with results         Relevant Orders    CBC and Auto Differential    RESOLVED: Depression F32.A    Relevant Medications    PARoxetine (Paxil) 20 mg tablet          Olivia Leos, DO

## 2024-07-09 NOTE — ASSESSMENT & PLAN NOTE
-She has done a great job in controlling her sugars.  She has been diet controlled  -Her hemoglobin A1c was 6.4 and we will continue to monitor

## 2024-07-09 NOTE — PATIENT INSTRUCTIONS
As we discussed we will have you get a CBC drawn today before leaving and I will call you with the results.  We are checking to make sure that you are not anemic because anemia could result from your occasional rectal bleeding and cause you to be tired.  Overall I was pleased with your checkup today and keep up the good work with your diet and exercise routine  We plan on seeing you back in 6 months for another checkup and please remember to get fasting lab work drawn prior to your next follow-up visit

## 2024-07-09 NOTE — ASSESSMENT & PLAN NOTE
-Currently adequately controlled on her Protonix and she will continue with her current medical regimen

## 2024-07-09 NOTE — ASSESSMENT & PLAN NOTE
-Blood pressure is currently well-controlled so we will continue with her current antihypertensive regimen

## 2024-07-10 DIAGNOSIS — R53.83 OTHER FATIGUE: ICD-10-CM

## 2024-07-10 DIAGNOSIS — D75.89 MACROCYTOSIS WITHOUT ANEMIA: Primary | ICD-10-CM

## 2024-07-10 NOTE — RESULT ENCOUNTER NOTE
Please call Azeb and let her know that her CBC did not show any evidence of anemia.  Her red cells were a little bit enlarged and because of that I would like to check a vitamin B12 level to make sure it is okay.  She can go to the lab at her earliest convenience and she does not need to be fasting.  Once the results are known we will contact her.  Thank you

## 2024-07-15 ENCOUNTER — LAB (OUTPATIENT)
Dept: LAB | Facility: LAB | Age: 79
End: 2024-07-15
Payer: MEDICARE

## 2024-07-15 DIAGNOSIS — R53.83 OTHER FATIGUE: ICD-10-CM

## 2024-07-15 DIAGNOSIS — D75.89 MACROCYTOSIS WITHOUT ANEMIA: ICD-10-CM

## 2024-07-15 LAB
FOLATE SERPL-MCNC: >22.3 NG/ML
TSH SERPL-ACNC: 1.11 MIU/L (ref 0.44–3.98)
VIT B12 SERPL-MCNC: 541 PG/ML (ref 211–911)

## 2024-07-15 PROCEDURE — 82607 VITAMIN B-12: CPT

## 2024-07-15 PROCEDURE — 84443 ASSAY THYROID STIM HORMONE: CPT

## 2024-07-15 PROCEDURE — 82746 ASSAY OF FOLIC ACID SERUM: CPT

## 2024-07-15 PROCEDURE — 36415 COLL VENOUS BLD VENIPUNCTURE: CPT

## 2024-07-16 NOTE — RESULT ENCOUNTER NOTE
Please call Azeb and let her know that the follow-up lab work we ordered is looking fine.  Her vitamin B12 level was well within desirable range as well as her folic acid level and thyroid blood test.  We ordered these tests because she has enlarged red cells but at this point I do not feel it represents anything dangerous at all and we will continue to periodically monitor.  If she has any questions I would be happy to speak to her directly.  Thank you

## 2024-09-24 ENCOUNTER — TELEPHONE (OUTPATIENT)
Dept: GASTROENTEROLOGY | Facility: CLINIC | Age: 79
End: 2024-09-24
Payer: MEDICARE

## 2024-09-24 DIAGNOSIS — K62.7 RADIATION PROCTITIS: Primary | ICD-10-CM

## 2024-09-24 RX ORDER — HYDROCORTISONE ACETATE 25 MG/1
25 SUPPOSITORY RECTAL DAILY
Qty: 14 SUPPOSITORY | Refills: 1 | Status: SHIPPED | OUTPATIENT
Start: 2024-09-24 | End: 2025-09-24

## 2024-09-24 NOTE — TELEPHONE ENCOUNTER
Called patient with information and she verbalized understanding  ----- Message from Perez Rosas sent at 9/24/2024  9:15 AM EDT -----  Latricia, please let Azeb know that this is likely from her proctitis.  I we will call her in some suppositories if she take with the next episode of bleeding.  ----- Message -----  From: Latricia Don MA  Sent: 9/23/2024   4:06 PM EDT  To: Perez Rosas, DO    Azeb called in and said she is bleeding from her rectum- she said you told her the next time this happens you would call in a prescription for her. [facility]

## 2025-01-08 ENCOUNTER — LAB (OUTPATIENT)
Facility: LAB | Age: 80
End: 2025-01-08
Payer: MEDICARE

## 2025-01-08 DIAGNOSIS — E11.9 TYPE 2 DIABETES MELLITUS WITHOUT COMPLICATION, WITHOUT LONG-TERM CURRENT USE OF INSULIN (MULTI): ICD-10-CM

## 2025-01-08 DIAGNOSIS — E78.2 MIXED HYPERLIPIDEMIA: ICD-10-CM

## 2025-01-08 LAB
ANION GAP SERPL CALC-SCNC: 10 MMOL/L (ref 10–20)
BUN SERPL-MCNC: 15 MG/DL (ref 6–23)
CALCIUM SERPL-MCNC: 9.1 MG/DL (ref 8.6–10.3)
CHLORIDE SERPL-SCNC: 105 MMOL/L (ref 98–107)
CHOLEST SERPL-MCNC: 136 MG/DL (ref 0–199)
CHOLESTEROL/HDL RATIO: 2.6
CO2 SERPL-SCNC: 32 MMOL/L (ref 21–32)
CREAT SERPL-MCNC: 1.1 MG/DL (ref 0.5–1.05)
EGFRCR SERPLBLD CKD-EPI 2021: 51 ML/MIN/1.73M*2
EST. AVERAGE GLUCOSE BLD GHB EST-MCNC: 143 MG/DL
GLUCOSE SERPL-MCNC: 111 MG/DL (ref 74–99)
HBA1C MFR BLD: 6.6 %
HDLC SERPL-MCNC: 52 MG/DL
LDLC SERPL CALC-MCNC: 64 MG/DL
NON HDL CHOLESTEROL: 84 MG/DL (ref 0–149)
POTASSIUM SERPL-SCNC: 3.8 MMOL/L (ref 3.5–5.3)
SODIUM SERPL-SCNC: 143 MMOL/L (ref 136–145)
TRIGL SERPL-MCNC: 98 MG/DL (ref 0–149)
VLDL: 20 MG/DL (ref 0–40)

## 2025-01-08 PROCEDURE — 83036 HEMOGLOBIN GLYCOSYLATED A1C: CPT

## 2025-01-08 PROCEDURE — 80061 LIPID PANEL: CPT

## 2025-01-08 PROCEDURE — 80048 BASIC METABOLIC PNL TOTAL CA: CPT

## 2025-01-14 ENCOUNTER — APPOINTMENT (OUTPATIENT)
Age: 80
End: 2025-01-14
Payer: MEDICARE

## 2025-01-14 VITALS
WEIGHT: 188 LBS | HEART RATE: 71 BPM | HEIGHT: 65 IN | BODY MASS INDEX: 31.32 KG/M2 | OXYGEN SATURATION: 98 % | DIASTOLIC BLOOD PRESSURE: 72 MMHG | SYSTOLIC BLOOD PRESSURE: 122 MMHG

## 2025-01-14 DIAGNOSIS — E11.9 TYPE 2 DIABETES MELLITUS WITHOUT COMPLICATION, WITHOUT LONG-TERM CURRENT USE OF INSULIN (MULTI): ICD-10-CM

## 2025-01-14 DIAGNOSIS — E78.5 HYPERLIPIDEMIA, UNSPECIFIED HYPERLIPIDEMIA TYPE: ICD-10-CM

## 2025-01-14 DIAGNOSIS — I73.9 PERIPHERAL VASCULAR DISEASE, UNSPECIFIED (CMS-HCC): ICD-10-CM

## 2025-01-14 DIAGNOSIS — I10 BENIGN HYPERTENSION: ICD-10-CM

## 2025-01-14 DIAGNOSIS — C53.9 MALIGNANT NEOPLASM OF CERVIX, UNSPECIFIED SITE (MULTI): ICD-10-CM

## 2025-01-14 DIAGNOSIS — F32.A DEPRESSION, UNSPECIFIED DEPRESSION TYPE: ICD-10-CM

## 2025-01-14 DIAGNOSIS — I70.212 ATHEROSCLEROSIS OF NATIVE ARTERIES OF EXTREMITIES WITH INTERMITTENT CLAUDICATION, LEFT LEG (CMS-HCC): ICD-10-CM

## 2025-01-14 DIAGNOSIS — F32.0 CURRENT MILD EPISODE OF MAJOR DEPRESSIVE DISORDER WITHOUT PRIOR EPISODE (CMS-HCC): ICD-10-CM

## 2025-01-14 DIAGNOSIS — K21.9 CHRONIC GERD: ICD-10-CM

## 2025-01-14 DIAGNOSIS — Z00.00 MEDICARE ANNUAL WELLNESS VISIT, SUBSEQUENT: Primary | ICD-10-CM

## 2025-01-14 PROBLEM — R53.83 OTHER FATIGUE: Status: RESOLVED | Noted: 2024-07-09 | Resolved: 2025-01-14

## 2025-01-14 PROBLEM — K62.5 RECTAL BLEEDING: Status: RESOLVED | Noted: 2023-07-10 | Resolved: 2025-01-14

## 2025-01-14 PROCEDURE — 1036F TOBACCO NON-USER: CPT | Performed by: INTERNAL MEDICINE

## 2025-01-14 PROCEDURE — 1157F ADVNC CARE PLAN IN RCRD: CPT | Performed by: INTERNAL MEDICINE

## 2025-01-14 PROCEDURE — 3078F DIAST BP <80 MM HG: CPT | Performed by: INTERNAL MEDICINE

## 2025-01-14 PROCEDURE — 1123F ACP DISCUSS/DSCN MKR DOCD: CPT | Performed by: INTERNAL MEDICINE

## 2025-01-14 PROCEDURE — 1160F RVW MEDS BY RX/DR IN RCRD: CPT | Performed by: INTERNAL MEDICINE

## 2025-01-14 PROCEDURE — 1170F FXNL STATUS ASSESSED: CPT | Performed by: INTERNAL MEDICINE

## 2025-01-14 PROCEDURE — G0439 PPPS, SUBSEQ VISIT: HCPCS | Performed by: INTERNAL MEDICINE

## 2025-01-14 PROCEDURE — 1159F MED LIST DOCD IN RCRD: CPT | Performed by: INTERNAL MEDICINE

## 2025-01-14 PROCEDURE — 99214 OFFICE O/P EST MOD 30 MIN: CPT | Performed by: INTERNAL MEDICINE

## 2025-01-14 PROCEDURE — 3074F SYST BP LT 130 MM HG: CPT | Performed by: INTERNAL MEDICINE

## 2025-01-14 RX ORDER — LISINOPRIL 5 MG/1
5 TABLET ORAL DAILY
Qty: 100 TABLET | Refills: 1 | Status: SHIPPED | OUTPATIENT
Start: 2025-01-14

## 2025-01-14 RX ORDER — PENTOXIFYLLINE 400 MG/1
400 TABLET, EXTENDED RELEASE ORAL
Qty: 300 TABLET | Refills: 1 | Status: SHIPPED | OUTPATIENT
Start: 2025-01-14

## 2025-01-14 RX ORDER — ROSUVASTATIN CALCIUM 40 MG/1
40 TABLET, COATED ORAL DAILY
Qty: 100 TABLET | Refills: 1 | Status: SHIPPED | OUTPATIENT
Start: 2025-01-14

## 2025-01-14 RX ORDER — PAROXETINE HYDROCHLORIDE 20 MG/1
20 TABLET, FILM COATED ORAL DAILY
Qty: 100 TABLET | Refills: 1 | Status: SHIPPED | OUTPATIENT
Start: 2025-01-14

## 2025-01-14 RX ORDER — FAMOTIDINE 20 MG/1
20 TABLET, FILM COATED ORAL 2 TIMES DAILY
Qty: 60 TABLET | Refills: 5 | Status: SHIPPED | OUTPATIENT
Start: 2025-01-14 | End: 2025-07-13

## 2025-01-14 RX ORDER — CLOPIDOGREL BISULFATE 75 MG/1
75 TABLET ORAL
Qty: 100 TABLET | Refills: 1 | Status: SHIPPED | OUTPATIENT
Start: 2025-01-14

## 2025-01-14 ASSESSMENT — ENCOUNTER SYMPTOMS
BLOOD IN STOOL: 0
VOMITING: 0
FATIGUE: 0
COUGH: 0
ARTHRALGIAS: 0
BACK PAIN: 0
PALPITATIONS: 0
SHORTNESS OF BREATH: 0
ABDOMINAL PAIN: 0
WHEEZING: 0
NAUSEA: 0
DIARRHEA: 0

## 2025-01-14 ASSESSMENT — ACTIVITIES OF DAILY LIVING (ADL)
TAKING_MEDICATION: INDEPENDENT
DOING_HOUSEWORK: INDEPENDENT
BATHING: INDEPENDENT
MANAGING_FINANCES: INDEPENDENT
GROCERY_SHOPPING: INDEPENDENT
DRESSING: INDEPENDENT

## 2025-01-14 NOTE — ASSESSMENT & PLAN NOTE
-Hemoglobin A1c is excellent at 6.1  -She has been diet controlled  -I will have her go back to the lab in 2 weeks to check her urine microalbumin and also to reassess her BMP due to potential dehydration.  I have agreed to contact her with results    Orders:    Albumin-Creatinine Ratio, Urine Random; Future    Basic Metabolic Panel; Future    Basic Metabolic Panel; Future    Hemoglobin A1C; Future

## 2025-01-14 NOTE — ASSESSMENT & PLAN NOTE
-Blood pressure is under good control so we will continue with her current medical regimen    Orders:    lisinopril 5 mg tablet; Take 1 tablet (5 mg) by mouth once daily.

## 2025-01-14 NOTE — ASSESSMENT & PLAN NOTE
-We discussed fall prevention  -We discussed the importance of exercise  -She has completed her advance directives

## 2025-01-14 NOTE — ASSESSMENT & PLAN NOTE
-She states that her current acid reducing medication does not do anything for her so I would like to try Pepcid twice daily.  I sent a prescription to her pharmacy and she will give me an update in a couple weeks    Orders:    famotidine (Pepcid) 20 mg tablet; Take 1 tablet (20 mg) by mouth 2 times a day.

## 2025-01-14 NOTE — ASSESSMENT & PLAN NOTE
Orders:    clopidogrel (Plavix) 75 mg tablet; Take 1 tablet (75 mg) by mouth once daily.    pentoxifylline (Trental) 400 mg ER tablet; Take 1 tablet (400 mg) by mouth 3 times daily (morning, midday, late afternoon). Do not crush, chew, or split.

## 2025-01-14 NOTE — ASSESSMENT & PLAN NOTE
-Her cholesterol profile is excellent so we will continue with her current medical regimen and check her cholesterol again in 1 year per Medicare protocol    Orders:    rosuvastatin (Crestor) 40 mg tablet; Take 1 tablet (40 mg) by mouth once daily.

## 2025-01-14 NOTE — PROGRESS NOTES
"Subjective   Reason for Visit: Phyllis A Snellenberger is an 79 y.o. female here for a Medicare Wellness visit.     Past Medical, Surgical, and Family History reviewed and updated in chart.    Reviewed all medications by prescribing practitioner or clinical pharmacist (such as prescriptions, OTCs, herbal therapies and supplements) and documented in the medical record.    HPI  She is here today for her routine checkup.  We took this opportunity to complete her annual Medicare wellness visit.  She denies any symptoms of depression and she has had no falls in the last year.  We discussed the importance of exercise to maintain good balance.  Her memory appears to be good as she is highly independent doing everything for herself.  Her son lives downstairs of her house.  Her hearing is little bit challenged and she does only hearing aids but does not wear them because they are not comfortable.  I have suggested that she perhaps go back to the seller to see if they can adjust the aids so they are more comfortable.  She also tries to eat a well-balanced diet.  She has completed her advance directives and provided a copy here  She has also been taking a collagen supplement for the past 2-1/2 months and she describes it as a \"miracle \".  She states her knees used to bother her a lot and now they feel great.  She is even walking better.  We also reviewed her most recent laboratory test results and her hemoglobin A1c came back satisfactory at 6.6.  I reminded her that she is technically a diabetic but has been diet controlled up to this point.  We remind her to eat healthy and also stay physically active.  We are also minded that she was diagnosed with cervical cancer and she does follow regularly with her gynecologist and is done very well.  She complains of some slight pressure in her pelvic region and I have suggested she discuss this with her specialist next time.  Her cholesterol profile is excellent and we are providing " "refills today.  I will summarize everything in a problem based format  Patient Care Team:  Olivia Leos DO as PCP - General  Olivia Leos DO as PCP - [facility] Attributed Provider  Sushila Gould MD as Obstetrician (Obstetrics and Gynecology)     Review of Systems   Constitutional:  Negative for fatigue.   Respiratory:  Negative for cough, shortness of breath and wheezing.    Cardiovascular:  Negative for chest pain, palpitations and leg swelling.   Gastrointestinal:  Negative for abdominal pain, blood in stool, diarrhea, nausea and vomiting.   Musculoskeletal:  Negative for arthralgias and back pain.       Objective   Vitals:  /72   Pulse 71   Ht 1.651 m (5' 5\")   Wt 85.3 kg (188 lb)   SpO2 98%   BMI 31.28 kg/m²       Physical Exam  Vitals and nursing note reviewed.   Constitutional:       General: She is not in acute distress.     Appearance: Normal appearance.   HENT:      Head: Normocephalic and atraumatic.   Eyes:      Conjunctiva/sclera: Conjunctivae normal.   Cardiovascular:      Rate and Rhythm: Normal rate and regular rhythm.      Heart sounds: Normal heart sounds.   Pulmonary:      Effort: No respiratory distress.      Breath sounds: No wheezing.   Abdominal:      Palpations: Abdomen is soft.      Tenderness: There is no abdominal tenderness. There is no guarding.   Musculoskeletal:         General: No swelling. Normal range of motion.   Skin:     General: Skin is warm and dry.   Neurological:      General: No focal deficit present.      Mental Status: She is alert and oriented to person, place, and time.   Psychiatric:         Behavior: Behavior normal.       Recent Results (from the past 4 weeks)   Lipid Panel    Collection Time: 01/08/25  9:10 AM   Result Value Ref Range    Cholesterol 136 0 - 199 mg/dL    HDL-Cholesterol 52.0 mg/dL    Cholesterol/HDL Ratio 2.6     LDL Calculated 64 <=99 mg/dL    VLDL 20 0 - 40 mg/dL    Triglycerides 98 0 - 149 mg/dL    Non HDL Cholesterol 84 0 - " 149 mg/dL   Hemoglobin A1C    Collection Time: 01/08/25  9:10 AM   Result Value Ref Range    Hemoglobin A1C 6.6 (H) See comment %    Estimated Average Glucose 143 Not Established mg/dL   Basic Metabolic Panel    Collection Time: 01/08/25  9:10 AM   Result Value Ref Range    Glucose 111 (H) 74 - 99 mg/dL    Sodium 143 136 - 145 mmol/L    Potassium 3.8 3.5 - 5.3 mmol/L    Chloride 105 98 - 107 mmol/L    Bicarbonate 32 21 - 32 mmol/L    Anion Gap 10 10 - 20 mmol/L    Urea Nitrogen 15 6 - 23 mg/dL    Creatinine 1.10 (H) 0.50 - 1.05 mg/dL    eGFR 51 (L) >60 mL/min/1.73m*2    Calcium 9.1 8.6 - 10.3 mg/dL       Assessment & Plan  Medicare annual wellness visit, subsequent  -We discussed fall prevention  -We discussed the importance of exercise  -She has completed her advance directives         Malignant neoplasm of cervix, unspecified site (Multi)  -She continues to follow closely with her gynecologist and has done very well         Type 2 diabetes mellitus without complication, without long-term current use of insulin (Multi)  -Hemoglobin A1c is excellent at 6.1  -She has been diet controlled  -I will have her go back to the lab in 2 weeks to check her urine microalbumin and also to reassess her BMP due to potential dehydration.  I have agreed to contact her with results    Orders:    Albumin-Creatinine Ratio, Urine Random; Future    Basic Metabolic Panel; Future    Basic Metabolic Panel; Future    Hemoglobin A1C; Future    Benign hypertension  -Blood pressure is under good control so we will continue with her current medical regimen    Orders:    lisinopril 5 mg tablet; Take 1 tablet (5 mg) by mouth once daily.    Peripheral vascular disease, unspecified (CMS-HCC)    Orders:    clopidogrel (Plavix) 75 mg tablet; Take 1 tablet (75 mg) by mouth once daily.    pentoxifylline (Trental) 400 mg ER tablet; Take 1 tablet (400 mg) by mouth 3 times daily (morning, midday, late afternoon). Do not crush, chew, or  split.    Depression, unspecified depression type    Orders:    PARoxetine (Paxil) 20 mg tablet; Take 1 tablet (20 mg) by mouth once daily.    Hyperlipidemia, unspecified hyperlipidemia type  -Her cholesterol profile is excellent so we will continue with her current medical regimen and check her cholesterol again in 1 year per Medicare protocol    Orders:    rosuvastatin (Crestor) 40 mg tablet; Take 1 tablet (40 mg) by mouth once daily.    Chronic GERD  -She states that her current acid reducing medication does not do anything for her so I would like to try Pepcid twice daily.  I sent a prescription to her pharmacy and she will give me an update in a couple weeks    Orders:    famotidine (Pepcid) 20 mg tablet; Take 1 tablet (20 mg) by mouth 2 times a day.    Atherosclerosis of native arteries of extremities with intermittent claudication, left leg (CMS-HCC)  -Her condition is stable and she was seen recently by her vascular specialist.  She was instructed to return in 1 year for a follow-up visit  -We will continue to aggressively modify risk factors         Current mild episode of major depressive disorder without prior episode (CMS-HCC)  -She is doing very well on her current medical regimen and we will continue to monitor closely    Pt Instructions  As we discussed because of your lab test results a day which implies that you may have been a little bit dehydrated when he went to the lab, I want you to start drinking more water and especially drink water the morning of your blood draw.  In 2 weeks you will go back to the lab for a blood draw and you will also need to give a urine specimen.  When the results are known I will contact you  I also sent a prescription to your pharmacy for a medication called famotidine which you will need to take twice a day or every 12 hours.  This is for your stomach.  Please give me an update after taking this for a couple of weeks  If everything goes as expected, I will see you  back in 6 months and please remember to go for fasting lab work prior to that visit.

## 2025-01-14 NOTE — ASSESSMENT & PLAN NOTE
-Her condition is stable and she was seen recently by her vascular specialist.  She was instructed to return in 1 year for a follow-up visit  -We will continue to aggressively modify risk factors

## 2025-01-14 NOTE — ASSESSMENT & PLAN NOTE
-She is doing very well on her current medical regimen and we will continue to monitor closely    Pt Instructions  As we discussed because of your lab test results a day which implies that you may have been a little bit dehydrated when he went to the lab, I want you to start drinking more water and especially drink water the morning of your blood draw.  In 2 weeks you will go back to the lab for a blood draw and you will also need to give a urine specimen.  When the results are known I will contact you  I also sent a prescription to your pharmacy for a medication called famotidine which you will need to take twice a day or every 12 hours.  This is for your stomach.  Please give me an update after taking this for a couple of weeks  If everything goes as expected, I will see you back in 6 months and please remember to go for fasting lab work prior to that visit.

## 2025-04-23 NOTE — PROGRESS NOTES
Patient ID: Phyllis A Snellenberger is a 79 y.o. female.    HPI: The patient is a IB1 but pathological stage IIA1     Referring Physician: Jeor Membreno ([phone]) (Fax: [phone]) of [facility]    Tumor History:  -19 radical hysterectomy- Met high risk criteria so sent for chemoRT  -Finished RT May 2019  -PET 19: negative.   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   father -  of MI at 44yo, mother with alzheimers disease   Otherwise denies a history of gyn related cancers including ovarian, endometrial, breast, pancreas, and GI cancers.      Social History:   drinks 2 alcoholic beverages each afternoon.   Denies a history of smoking or recreational drug use. The patient is retired. Lives with son who smokes.     OBGYN History:  The patient is a .  Entered menopause in her 40s.  She has used OCP for 6 months years ago.  She has used HRT for 10 years in the past.       Screening:  -Mammogram:  normal    C.  UTERUS, CERVIX, BILATERAL FALLOPIAN TUBES, OVARIES, PARAMETRIUM, VAGINA,  RADICAL HYSTERECTOMY WITH BILATERAL SALPINGO-OOPHORECTOMY:    -- CERVIX: INVASIVE ENDOCERVICAL CARCINOMA WITH MIXED FEATURES OF STRATIFIED  MUCIN-PRODUCING CARCINOMA, ADENOSQUAMOUS CARCINOMA, AND USUAL TYPE ENDOCERVICAL  ADENOCARCINOMA, WITH 96% CERVICAL STROMAL INVOLVEMENT AND LYMPHVASCULAR  SPACE  INVASION. SEE NOTE AND CANCER SUMMARY BELOW.  -- VAGINAL CUFF: SURFACE EPITHELIUM DIFFUSELY INVOLVED BY ADENOCARCINOMA.  -- PARAMETRIUM: ADIPOSE TISSUE AND ONE LYMPH NODE WITH NO EVIDENCE OF  MALIGNANCY; MEDICAL CALCIFIC SCLEROSIS INVOLVING LARGER VESSELS.  -- ENDOMETRIUM: ENDOMETRIAL POLYPS IN THE BACKGROUND OF ATROPHIC ENDOMETRIUM.  -- MYOMETRIUM: EXTENSIVE ADENOMYOSIS AND MULTIPLE PARTIALLY HYALINIZED  LEIOMYOMAS.  -- FALLOPIAN TUBES: BILATERAL PARATUBAL CYSTS.  -- RIGHT OVARY: CYSTIC SURFACE INCLUSIONS.  -- LEFT OVARY: MULTIPLE SMALL BENIGN MUCINOUS CYSTS.     Objective    BSA: 1.92 meters squared  /67   Pulse 70   Temp 36.5 °C (97.7 °F)   Resp 14   Wt 80.2 kg (176 lb 11.2 oz)   SpO2 93%   BMI 29.40 kg/m²     Interval:  Patient is a 79 year old female with history of Stage IIA1 Grade 2 cervical cancer s/p radical hysterectomy, BSO 1/29/2019.  Here for annual exam and pap. Doing well overall. She is on blood thinners with arm and leg bruising, followed by PCP. PCP ordering mammograms.   Patient has stress incontinence wears pads, well managed per patient. Patient endorses bowel issues after radiation therapy with occasional bleeding while wiping, Colonoscopy UTD and followed by PCP. Patient denies any vaginal bleeding, pink tinged discharge. Patient denies any constipation, diarrhea or any other bowel issues. Patient denies hematuria or any other bladder issues. Patient denies any new or worsening changes of the vulva. Patient is not sexually active . Patient denies any itching or dryness.     Physical Exam:    Constitutional: Doing well. ANNE-MARIE  Eyes: PERRL  ENMT: Moist mucus membranes  Head/Neck: Supple. Symmetrical  Cardiovascular: Regular, rate and rhythm. 2+ equal pulses of the extremities  Respiratory/Thorax: CTA. RRR. Chest rise symmetrical.  Gastrointestinal: Non-distended, soft, non-tender  Genitourinary:   Normal external female genitalia. No vulvar lesions noted  Speculum exam: Smooth  vaginal walls without lesions or masses. Shortened vaginal vault, vaginal cuff visualized without lesions. Surgically absent cervix.  Bimanual exam: Smooth vaginal wall without lesions or masses.  Surgically absent uterus, cervix, and adnexa.  Rectovaginal exam: smooth rectovaginal septum without lesions or masses  Musculoskeletal: ROM intact, no joint swelling, normal strength  Extremities: No edema  Neurological: Alert and oriented x 3. Pleasant and cooperative.  Lymphatic: No lymphadenopathy. No lymphedema  Psychological: Appropriate mood and behavior  Skin: Warm and dry, no lesions, no rashes    A complete review of systems was performed and all systems were normal except what is noted in the interval history.     Performance Status:  Asymptomatic    Assessment/Plan     Patient is a 79 year old female with history of Stage IIA1 Grade 2 cervical cancer s/p radical hysterectomy, BSO 1/29/2019.  ANNE-MARIE 6 years.      PLAN:  Pap today, F/U results  If stable F/U in 1 year or as needed    Physical examination was within normal limits today.  She is currently ANNE-MARIE.  We reviewed signs and symptoms of possible recurrence with the patient and she will call our office should she experience any of these.    ROSIO Nayak  I was present with the PA student who participated in the documentation of this note.  I have personally seen and re-examined the patient and performed the medical decision-making components (assessment and plan of care). I have reviewed the PA student documentation and verified the findings in the note as written with additions or exceptions as stated in the body of this note.

## 2025-04-25 ENCOUNTER — OFFICE VISIT (OUTPATIENT)
Dept: GYNECOLOGIC ONCOLOGY | Facility: CLINIC | Age: 80
End: 2025-04-25
Payer: MEDICARE

## 2025-04-25 VITALS
OXYGEN SATURATION: 93 % | WEIGHT: 176.7 LBS | SYSTOLIC BLOOD PRESSURE: 138 MMHG | RESPIRATION RATE: 14 BRPM | BODY MASS INDEX: 29.4 KG/M2 | HEART RATE: 70 BPM | TEMPERATURE: 97.7 F | DIASTOLIC BLOOD PRESSURE: 67 MMHG

## 2025-04-25 DIAGNOSIS — Z85.41 HISTORY OF CERVICAL CANCER: Primary | ICD-10-CM

## 2025-04-25 PROCEDURE — 1126F AMNT PAIN NOTED NONE PRSNT: CPT | Performed by: NURSE PRACTITIONER

## 2025-04-25 PROCEDURE — 1159F MED LIST DOCD IN RCRD: CPT | Performed by: NURSE PRACTITIONER

## 2025-04-25 PROCEDURE — 1157F ADVNC CARE PLAN IN RCRD: CPT | Performed by: NURSE PRACTITIONER

## 2025-04-25 PROCEDURE — 99213 OFFICE O/P EST LOW 20 MIN: CPT | Performed by: NURSE PRACTITIONER

## 2025-04-25 PROCEDURE — 3078F DIAST BP <80 MM HG: CPT | Performed by: NURSE PRACTITIONER

## 2025-04-25 PROCEDURE — 3075F SYST BP GE 130 - 139MM HG: CPT | Performed by: NURSE PRACTITIONER

## 2025-04-25 ASSESSMENT — PAIN SCALES - GENERAL: PAINLEVEL_OUTOF10: 0-NO PAIN

## 2025-05-08 LAB
CYTOLOGY CMNT CVX/VAG CYTO-IMP: NORMAL
HPV HR 12 DNA GENITAL QL NAA+PROBE: POSITIVE
HPV HR GENOTYPES PNL CVX NAA+PROBE: POSITIVE
HPV16 DNA SPEC QL NAA+PROBE: NEGATIVE
HPV18 DNA SPEC QL NAA+PROBE: POSITIVE
LAB AP HPV GENOTYPE QUESTION: YES
LAB AP HPV HR: NORMAL
LABORATORY COMMENT REPORT: NORMAL
PATH REPORT.TOTAL CANCER: NORMAL

## 2025-05-19 ENCOUNTER — APPOINTMENT (OUTPATIENT)
Dept: GYNECOLOGIC ONCOLOGY | Facility: CLINIC | Age: 80
End: 2025-05-19
Payer: MEDICARE

## 2025-07-12 LAB
ANION GAP SERPL CALCULATED.4IONS-SCNC: 10 MMOL/L (CALC) (ref 7–17)
BUN SERPL-MCNC: 15 MG/DL (ref 7–25)
BUN/CREAT SERPL: 15 (CALC) (ref 6–22)
CALCIUM SERPL-MCNC: 9.4 MG/DL (ref 8.6–10.4)
CHLORIDE SERPL-SCNC: 102 MMOL/L (ref 98–110)
CO2 SERPL-SCNC: 28 MMOL/L (ref 20–32)
CREAT SERPL-MCNC: 0.99 MG/DL (ref 0.6–0.95)
EGFRCR SERPLBLD CKD-EPI 2021: 58 ML/MIN/1.73M2
EST. AVERAGE GLUCOSE BLD GHB EST-MCNC: 137 MG/DL
EST. AVERAGE GLUCOSE BLD GHB EST-SCNC: 7.6 MMOL/L
GLUCOSE SERPL-MCNC: 113 MG/DL (ref 65–99)
HBA1C MFR BLD: 6.4 %
POTASSIUM SERPL-SCNC: 4.6 MMOL/L (ref 3.5–5.3)
SODIUM SERPL-SCNC: 140 MMOL/L (ref 135–146)

## 2025-07-16 ENCOUNTER — APPOINTMENT (OUTPATIENT)
Age: 80
End: 2025-07-16
Payer: MEDICARE

## 2025-07-16 VITALS
SYSTOLIC BLOOD PRESSURE: 138 MMHG | DIASTOLIC BLOOD PRESSURE: 80 MMHG | HEIGHT: 65 IN | OXYGEN SATURATION: 95 % | HEART RATE: 70 BPM | WEIGHT: 176.3 LBS | BODY MASS INDEX: 29.37 KG/M2

## 2025-07-16 DIAGNOSIS — E11.9 TYPE 2 DIABETES MELLITUS WITHOUT COMPLICATION, WITHOUT LONG-TERM CURRENT USE OF INSULIN: ICD-10-CM

## 2025-07-16 DIAGNOSIS — F32.0 CURRENT MILD EPISODE OF MAJOR DEPRESSIVE DISORDER WITHOUT PRIOR EPISODE: ICD-10-CM

## 2025-07-16 DIAGNOSIS — E78.5 HYPERLIPIDEMIA, UNSPECIFIED HYPERLIPIDEMIA TYPE: ICD-10-CM

## 2025-07-16 DIAGNOSIS — I10 BENIGN HYPERTENSION: ICD-10-CM

## 2025-07-16 DIAGNOSIS — N18.31 CHRONIC KIDNEY DISEASE, STAGE 3A (MULTI): ICD-10-CM

## 2025-07-16 DIAGNOSIS — K21.9 CHRONIC GERD: ICD-10-CM

## 2025-07-16 DIAGNOSIS — F32.A DEPRESSION, UNSPECIFIED DEPRESSION TYPE: ICD-10-CM

## 2025-07-16 DIAGNOSIS — I73.9 PERIPHERAL VASCULAR DISEASE, UNSPECIFIED: ICD-10-CM

## 2025-07-16 DIAGNOSIS — M13.0 POLYARTHROPATHY: Primary | ICD-10-CM

## 2025-07-16 PROBLEM — E66.811 OBESITY (BMI 30.0-34.9): Status: RESOLVED | Noted: 2023-07-10 | Resolved: 2025-07-16

## 2025-07-16 PROBLEM — Z00.00 MEDICARE ANNUAL WELLNESS VISIT, SUBSEQUENT: Status: RESOLVED | Noted: 2024-01-09 | Resolved: 2025-07-16

## 2025-07-16 PROCEDURE — 1160F RVW MEDS BY RX/DR IN RCRD: CPT | Performed by: INTERNAL MEDICINE

## 2025-07-16 PROCEDURE — 3075F SYST BP GE 130 - 139MM HG: CPT | Performed by: INTERNAL MEDICINE

## 2025-07-16 PROCEDURE — 3079F DIAST BP 80-89 MM HG: CPT | Performed by: INTERNAL MEDICINE

## 2025-07-16 PROCEDURE — G2211 COMPLEX E/M VISIT ADD ON: HCPCS | Performed by: INTERNAL MEDICINE

## 2025-07-16 PROCEDURE — 1036F TOBACCO NON-USER: CPT | Performed by: INTERNAL MEDICINE

## 2025-07-16 PROCEDURE — 1159F MED LIST DOCD IN RCRD: CPT | Performed by: INTERNAL MEDICINE

## 2025-07-16 PROCEDURE — 99214 OFFICE O/P EST MOD 30 MIN: CPT | Performed by: INTERNAL MEDICINE

## 2025-07-16 RX ORDER — FAMOTIDINE 20 MG/1
20 TABLET, FILM COATED ORAL 2 TIMES DAILY
Qty: 200 TABLET | Refills: 1 | Status: SHIPPED | OUTPATIENT
Start: 2025-07-16 | End: 2026-01-12

## 2025-07-16 RX ORDER — CLOPIDOGREL BISULFATE 75 MG/1
75 TABLET ORAL
Qty: 100 TABLET | Refills: 1 | Status: SHIPPED | OUTPATIENT
Start: 2025-07-16

## 2025-07-16 RX ORDER — ROSUVASTATIN CALCIUM 40 MG/1
40 TABLET, COATED ORAL DAILY
Qty: 100 TABLET | Refills: 1 | Status: SHIPPED | OUTPATIENT
Start: 2025-07-16

## 2025-07-16 RX ORDER — PAROXETINE 20 MG/1
20 TABLET, FILM COATED ORAL DAILY
Qty: 100 TABLET | Refills: 1 | Status: SHIPPED | OUTPATIENT
Start: 2025-07-16

## 2025-07-16 RX ORDER — PENTOXIFYLLINE 400 MG/1
400 TABLET, EXTENDED RELEASE ORAL
Qty: 300 TABLET | Refills: 1 | Status: SHIPPED | OUTPATIENT
Start: 2025-07-16

## 2025-07-16 RX ORDER — LISINOPRIL 5 MG/1
5 TABLET ORAL DAILY
Qty: 100 TABLET | Refills: 1 | Status: SHIPPED | OUTPATIENT
Start: 2025-07-16

## 2025-07-16 ASSESSMENT — ENCOUNTER SYMPTOMS
BLOOD IN STOOL: 0
BACK PAIN: 0
FATIGUE: 0
PALPITATIONS: 0
COUGH: 0
WHEEZING: 0
DIARRHEA: 0
NAUSEA: 0
VOMITING: 0
SHORTNESS OF BREATH: 0
ABDOMINAL PAIN: 0

## 2025-07-16 NOTE — ASSESSMENT & PLAN NOTE
- Blood pressure remains under satisfactory control.  Her blood pressure is higher today than usual at home but she had a stressful time getting in this morning

## 2025-07-16 NOTE — PROGRESS NOTES
"Subjective   Patient ID: Phyllis A Snellenberger is a 80 y.o. female who presents for Follow-up (6 MO CK).  HPI  She is here today for her routine 6-month checkup.  We did conduct a review of systems and she states that \"I hurt all over \".  Her biggest problem are her knees.  She has been seen and evaluated by orthopedic surgery and she states they were not comfortable advising surgery due to her risk of blood clots at [facility].  She states that when she has a long day she applies Biofreeze patches on her knees which helps.  She states she purchased some Tylenol the other day but only tried 2 tablets and did not feel it worked.  She states she also hurts in other joints and it seems to travel at times.  We discussed checking lab work for inflammation and I have also suggested that is possible she may be having a reaction from her rosuvastatin.  We discussed temporarily stopping it but she is very apprehensive because of her very high cholesterol.  I will run a CPK in addition to other lab work and we will see her back in follow-up.  We also reviewed her most recent laboratory test results.  Her hemoglobin A1c was good at 6.4 which has improved.  Her kidney function also has slightly improved.  We discussed doing a urine microalbumin study.  We also went through all of her medications and we are providing refills today.  I will summarize everything in a problem based format.  Review of Systems   Constitutional:  Negative for fatigue.   Respiratory:  Negative for cough, shortness of breath and wheezing.    Cardiovascular:  Negative for chest pain, palpitations and leg swelling.   Gastrointestinal:  Negative for abdominal pain, blood in stool, diarrhea, nausea and vomiting.   Musculoskeletal:  Negative for back pain.     Objective   Physical Exam  Vitals and nursing note reviewed.   Constitutional:       General: She is not in acute distress.     Appearance: Normal appearance.   HENT:      Head: Normocephalic and " atraumatic.     Eyes:      Conjunctiva/sclera: Conjunctivae normal.       Cardiovascular:      Rate and Rhythm: Normal rate and regular rhythm.      Heart sounds: Normal heart sounds.   Pulmonary:      Effort: No respiratory distress.      Breath sounds: No wheezing.   Abdominal:      Palpations: Abdomen is soft.      Tenderness: There is no abdominal tenderness. There is no guarding.     Musculoskeletal:         General: No swelling. Normal range of motion.     Skin:     General: Skin is warm and dry.     Neurological:      General: No focal deficit present.      Mental Status: She is alert and oriented to person, place, and time.     Psychiatric:         Behavior: Behavior normal.       Assessment/Plan   Problem List Items Addressed This Visit           ICD-10-CM    Type 2 diabetes mellitus without complication, without long-term current use of insulin E11.9    - Her hemoglobin A1c is 6.4 which is excellent and we will continue to monitor  -We are also checking a urine microalbumin study         Relevant Medications    lisinopril 5 mg tablet    rosuvastatin (Crestor) 40 mg tablet    Chronic GERD K21.9    Relevant Medications    famotidine (Pepcid) 20 mg tablet    Hyperlipidemia E78.5    Relevant Medications    rosuvastatin (Crestor) 40 mg tablet    Benign hypertension I10    - Blood pressure remains under satisfactory control.  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  -Because she has multiple joints symptoms I am ordering biomarkers of inflammation and his  CPK.  We will see her back in follow-up         Relevant Orders    Albumin-Creatinine Ratio, Urine Random    CK    Sedimentation Rate    C-reactive protein    Rheumatoid factor    KARLENE    Uric acid    Chronic kidney disease, stage 3a (Multi) N18.31    - Kidney function remains stable and we will continue to monitor         Relevant Medications    lisinopril 5 mg tablet    rosuvastatin (Crestor) 40 mg tablet     Other Visit Diagnoses         Codes      Peripheral vascular disease, unspecified     I73.9    Relevant Medications    clopidogrel (Plavix) 75 mg tablet    pentoxifylline (Trental) 400 mg ER tablet      Depression, unspecified depression type     F32.A    Relevant Medications    PARoxetine (Paxil) 20 mg tablet        Patient instructions  As we discussed I will have you stop at the lab today before leaving to check your urine for protein and to do blood work so that we can check for inflammation in your body.  I would like to see you back in a couple of weeks to go over results and discuss how you are feeling.  Please go ahead and take your Tylenol as directed for 1 week just to see if it might help.       Olivia Leos, DO

## 2025-07-16 NOTE — PATIENT INSTRUCTIONS
Patient instructions  As we discussed I will have you stop at the lab today before leaving to check your urine for protein and to do blood work so that we can check for inflammation in your body.  I would like to see you back in a couple of weeks to go over results and discuss how you are feeling.  Please go ahead and take your Tylenol as directed for 1 week just to see if it might help.

## 2025-07-16 NOTE — ASSESSMENT & PLAN NOTE
- I suggested temporarily stopping her cholesterol medicine but she is apprehensive.  -Because she has multiple joints symptoms I am ordering biomarkers of inflammation and his CPK.  We will see her back in follow-up

## 2025-07-16 NOTE — ASSESSMENT & PLAN NOTE
- Her hemoglobin A1c is 6.4 which is excellent and we will continue to monitor  -We are also checking a urine microalbumin study

## 2025-07-17 LAB
ALBUMIN/CREAT UR: 128 MG/G CREAT
ANA PAT SER IF-IMP: ABNORMAL
ANA SER QL IF: POSITIVE
ANA TITR SER IF: ABNORMAL TITER
CK SERPL-CCNC: 61 U/L (ref 16–215)
CREAT UR-MCNC: 97 MG/DL (ref 20–275)
CRP SERPL-MCNC: <3 MG/L
ERYTHROCYTE [SEDIMENTATION RATE] IN BLOOD BY WESTERGREN METHOD: 2 MM/H
MICROALBUMIN UR-MCNC: 12.4 MG/DL
RHEUMATOID FACT SERPL-ACNC: <10 IU/ML
URATE SERPL-MCNC: 4.9 MG/DL (ref 2.5–7)

## 2025-07-30 ENCOUNTER — APPOINTMENT (OUTPATIENT)
Age: 80
End: 2025-07-30
Payer: MEDICARE

## 2025-07-30 VITALS
OXYGEN SATURATION: 97 % | HEART RATE: 76 BPM | HEIGHT: 65 IN | DIASTOLIC BLOOD PRESSURE: 72 MMHG | WEIGHT: 176.6 LBS | BODY MASS INDEX: 29.42 KG/M2 | SYSTOLIC BLOOD PRESSURE: 134 MMHG

## 2025-07-30 DIAGNOSIS — M13.0 POLYARTHROPATHY: Primary | ICD-10-CM

## 2025-07-30 DIAGNOSIS — E11.29 DIABETES MELLITUS WITH PROTEINURIA (MULTI): ICD-10-CM

## 2025-07-30 DIAGNOSIS — N18.31 CHRONIC KIDNEY DISEASE, STAGE 3A (MULTI): ICD-10-CM

## 2025-07-30 DIAGNOSIS — E78.2 MIXED HYPERLIPIDEMIA: ICD-10-CM

## 2025-07-30 DIAGNOSIS — E11.9 TYPE 2 DIABETES MELLITUS WITHOUT COMPLICATION, WITHOUT LONG-TERM CURRENT USE OF INSULIN: ICD-10-CM

## 2025-07-30 DIAGNOSIS — R80.9 DIABETES MELLITUS WITH PROTEINURIA (MULTI): ICD-10-CM

## 2025-07-30 PROCEDURE — 3075F SYST BP GE 130 - 139MM HG: CPT | Performed by: INTERNAL MEDICINE

## 2025-07-30 PROCEDURE — 3078F DIAST BP <80 MM HG: CPT | Performed by: INTERNAL MEDICINE

## 2025-07-30 PROCEDURE — 99214 OFFICE O/P EST MOD 30 MIN: CPT | Performed by: INTERNAL MEDICINE

## 2025-07-30 PROCEDURE — 1159F MED LIST DOCD IN RCRD: CPT | Performed by: INTERNAL MEDICINE

## 2025-07-30 RX ORDER — LISINOPRIL 10 MG/1
10 TABLET ORAL DAILY
Qty: 100 TABLET | Refills: 3 | Status: SHIPPED | OUTPATIENT
Start: 2025-07-30 | End: 2026-09-03

## 2025-07-30 NOTE — PROGRESS NOTES
Subjective   Patient ID: Phyllis A Snellenberger is a 80 y.o. female who presents for Follow-up (2 WK CK).  HPI  She is here today for follow-up.  Because of multiple joint aches I decided to order some biomarkers of inflammation and for the most part they look good.  She did have a mildly elevated KARLENE but she understands it 15% of the population can have an abnormal KARLENE and not have a disease.  We also checked a CK because of her statin therapy.  They came back normal.  We suggested we do a trial of no statin drugs but she naturally is nervous because of her heart condition.  We talked about her arthritis and she feels that she is achy because she does not move a lot.  She has bad knees.  She recently received an injection from her orthopedic specialist and it was suggested that she might benefit from red light therapy.  She states she is strongly considering this.  We talked about ways that she could exercise that would not hurt her knees.  I have strongly recommended swimming and she could also do chair exercises.  She does have the benefit of the Silver sneakers so she is going to the [facility] to check about signing up and get into the classes.  She will give me an update after she has done this for a couple of weeks.  We also discussed her diabetic proteinuria and we decided to increase the dose of her lisinopril from 5 mg up to 10 mg daily.  She will go back to the lab in 2 weeks to check her potassium level and I will call her with results.  Otherwise we thought she could safely return in 6 months for her general checkup and sooner if any problems.  Objective   Physical Exam  Vitals and nursing note reviewed.   Constitutional:       General: She is not in acute distress.     Appearance: Normal appearance.   HENT:      Head: Normocephalic and atraumatic.     Eyes:      Conjunctiva/sclera: Conjunctivae normal.       Cardiovascular:      Rate and Rhythm: Normal rate and regular rhythm.      Heart sounds: Normal  heart sounds.   Pulmonary:      Effort: No respiratory distress.      Breath sounds: No wheezing.   Abdominal:      Palpations: Abdomen is soft.      Tenderness: There is no abdominal tenderness. There is no guarding.     Musculoskeletal:         General: No swelling. Normal range of motion.     Skin:     General: Skin is warm and dry.     Neurological:      General: No focal deficit present.      Mental Status: She is alert and oriented to person, place, and time.     Psychiatric:         Behavior: Behavior normal.       Recent Results (from the past 4 weeks)   Basic Metabolic Panel    Collection Time: 07/11/25 10:46 AM   Result Value Ref Range    GLUCOSE 113 (H) 65 - 99 mg/dL    UREA NITROGEN (BUN) 15 7 - 25 mg/dL    CREATININE 0.99 (H) 0.60 - 0.95 mg/dL    EGFR 58 (L) > OR = 60 mL/min/1.73m2    BUN/CREATININE RATIO 15 6 - 22 (calc)    SODIUM 140 135 - 146 mmol/L    POTASSIUM 4.6 3.5 - 5.3 mmol/L    CHLORIDE 102 98 - 110 mmol/L    CARBON DIOXIDE 28 20 - 32 mmol/L    ELECTROLYTE BALANCE 10 7 - 17 mmol/L (calc)    CALCIUM 9.4 8.6 - 10.4 mg/dL   Hemoglobin A1C    Collection Time: 07/11/25 10:46 AM   Result Value Ref Range    HEMOGLOBIN A1c 6.4 (H) <5.7 %    eAG (mg/dL) 137 mg/dL    eAG (mmol/L) 7.6 mmol/L   Albumin-Creatinine Ratio, Urine Random    Collection Time: 07/16/25 11:00 AM   Result Value Ref Range    CREATININE, RANDOM URINE 97 20 - 275 mg/dL    ALBUMIN, URINE 12.4 See Note: mg/dL    ALBUMIN/CREATININE RATIO, RANDOM URINE 128 (H) <30 mg/g creat   CK    Collection Time: 07/16/25 11:00 AM   Result Value Ref Range    CREATINE KINASE, TOTAL 61 16 - 215 U/L   Sedimentation Rate    Collection Time: 07/16/25 11:00 AM   Result Value Ref Range    SED RATE BY MODIFIED WESTERGREN 2 < OR = 30 mm/h   C-reactive protein    Collection Time: 07/16/25 11:00 AM   Result Value Ref Range    C-REACTIVE PROTEIN <3.0 <8.0 mg/L   Rheumatoid factor    Collection Time: 07/16/25 11:00 AM   Result Value Ref Range    RHEUMATOID FACTOR  <10 <14 IU/mL   KARLENE    Collection Time: 07/16/25 11:00 AM   Result Value Ref Range    KARLENE SCREEN, IFA POSITIVE (A) NEGATIVE    KARLENE TITER 1:80 (H) titer    KARLENE PATTERN Nuclear, Speckled (A)    Uric acid    Collection Time: 07/16/25 11:00 AM   Result Value Ref Range    URIC ACID 4.9 2.5 - 7.0 mg/dL       Assessment/Plan   Problem List Items Addressed This Visit           ICD-10-CM    Type 2 diabetes mellitus without complication, without long-term current use of insulin E11.9    - We will see her back in 6 months for her general checkup but she knows to call sooner if things are not going well         Relevant Medications    lisinopril 10 mg tablet    Other Relevant Orders    Basic Metabolic Panel    Basic Metabolic Panel    Hemoglobin A1C    Hyperlipidemia E78.5    Relevant Orders    Lipid Panel    Polyarthropathy - Primary M13.0    - Her workup for inflammatory condition is essentially negative  -She is going to try more exercise and let me know if this is helping         Chronic kidney disease, stage 3a (Multi) N18.31    - Because of her diabetic proteinuria I am increasing the lisinopril from 5 mg up to 10 mg daily  -She will go back to the lab in approximately 2 weeks for a potassium checkup and I will call her with results  -We will check her protein level again when she comes back in 6 months         Relevant Medications    lisinopril 10 mg tablet   Patient instructions  As we discussed your lab work checking for inflammation came back negative.  It does not mean that you do not have arthritis but is suggest that you do not have inflammatory arthritis.  I agree that if you can start exercising more you might improve your symptoms and energy levels.  Please look into the Silver sneakers program at the [facility] and either try the swimming or chair exercises.  Please give me an update on your symptoms after a couple of weeks  As you know we increase the dose of your lisinopril from 5 mg up to 10 mg daily.  This is to  help protect your kidneys.  You had some unusual levels of protein in your kidneys and this medicine helps reduce the protein.  Please remember to go back to the lab in 2 weeks for a blood test because I want to make sure your potassium level is okay.  Sometimes these medicines can cause potassium levels to go up.  When this result comes back I will contact you.  We will check the urine protein when you come back in 6 months       Olivia Leos, DO

## 2025-07-30 NOTE — ASSESSMENT & PLAN NOTE
- We will see her back in 6 months for her general checkup but she knows to call sooner if things are not going well

## 2025-07-30 NOTE — ASSESSMENT & PLAN NOTE
- Her workup for inflammatory condition is essentially negative  -She is going to try more exercise and let me know if this is helping

## 2025-07-30 NOTE — PATIENT INSTRUCTIONS
Patient instructions  As we discussed your lab work checking for inflammation came back negative.  It does not mean that you do not have arthritis but is suggest that you do not have inflammatory arthritis.  I agree that if you can start exercising more you might improve your symptoms and energy levels.  Please look into the Silver sneakers program at the [facility] and either try the swimming or chair exercises.  Please give me an update on your symptoms after a couple of weeks  As you know we increase the dose of your lisinopril from 5 mg up to 10 mg daily.  This is to help protect your kidneys.  You had some unusual levels of protein in your kidneys and this medicine helps reduce the protein.  Please remember to go back to the lab in 2 weeks for a blood test because I want to make sure your potassium level is okay.  Sometimes these medicines can cause potassium levels to go up.  When this result comes back I will contact you.  We will check the urine protein when you come back in 6 months

## 2025-08-13 DIAGNOSIS — E11.9 TYPE 2 DIABETES MELLITUS WITHOUT COMPLICATION, WITHOUT LONG-TERM CURRENT USE OF INSULIN: ICD-10-CM

## 2026-01-28 ENCOUNTER — APPOINTMENT (OUTPATIENT)
Age: 81
End: 2026-01-28
Payer: MEDICARE